# Patient Record
Sex: FEMALE | Race: WHITE | NOT HISPANIC OR LATINO
[De-identification: names, ages, dates, MRNs, and addresses within clinical notes are randomized per-mention and may not be internally consistent; named-entity substitution may affect disease eponyms.]

---

## 2022-08-18 ENCOUNTER — APPOINTMENT (OUTPATIENT)
Dept: GASTROENTEROLOGY | Facility: CLINIC | Age: 82
End: 2022-08-18

## 2022-08-18 VITALS
OXYGEN SATURATION: 98 % | WEIGHT: 131 LBS | BODY MASS INDEX: 26.41 KG/M2 | TEMPERATURE: 97.7 F | HEART RATE: 72 BPM | DIASTOLIC BLOOD PRESSURE: 60 MMHG | HEIGHT: 59 IN | SYSTOLIC BLOOD PRESSURE: 108 MMHG

## 2022-08-18 PROCEDURE — 99214 OFFICE O/P EST MOD 30 MIN: CPT

## 2022-08-18 NOTE — ASSESSMENT
[FreeTextEntry1] : \par 1.  Abnormal CBC, significant absolute lymphocytosis, rule out chronic lymphocytic leukemia, at least not previously diagnosed and communicated to the patient\par 2.  A variety of recent symptoms such as malaise loss of appetite, small weight loss, possibly related to this hematologic condition\par 3.  Patient needs full medical evaluation and I am referring her to Dr.Hajat Haider\par \par More than 50% of the face to face time was devoted to counseling and /or coordination of care.  THis coordination of care may have included reviewing other medical notes and reports, and communicating with other health professionals\par

## 2022-08-18 NOTE — HISTORY OF PRESENT ILLNESS
[FreeTextEntry1] : In office visit, 82-year-old patient last seen 5 years ago for a colonoscopy, now referred by a covering internist Dr. Lianne hinds with recent onset of malaise, chills, just not feeling right, some soft stools perhaps more frequent, but not david diarrhea.  Stools were done and the culture was negative.\par Labs were done and were sent over, and it turns out that she has a significant lymphocytosis to 25,000 white count 70% or more being lymphocytes.  No blast forms are reported, hemoglobin and platelets are normal.  Sed rate normal.\par \par Patient was advised to see GI

## 2022-08-18 NOTE — PHYSICAL EXAM
[Bowel Sounds] : normal bowel sounds [Abdomen Soft] : soft [Abdomen Tenderness] : non-tender [] : no hepato-splenomegaly [Abdomen Mass (___ Cm)] : no abdominal mass palpated [Normal Sphincter Tone] : normal sphincter tone [No Rectal Mass] : no rectal mass [Internal Hemorrhoid] : no internal hemorrhoids [External Hemorrhoid] : no external hemorrhoids [Occult Blood Positive] : stool was negative for occult blood [FreeTextEntry1] : Small amount of brown formed guaiac-negative stool

## 2022-08-23 RX ORDER — LACTULOSE 10 G/15ML
10 SOLUTION ORAL
Qty: 450 | Refills: 5 | Status: DISCONTINUED | COMMUNITY
Start: 2022-08-23 | End: 2022-08-23

## 2022-08-30 ENCOUNTER — APPOINTMENT (OUTPATIENT)
Dept: FAMILY MEDICINE | Facility: CLINIC | Age: 82
End: 2022-08-30

## 2022-08-30 ENCOUNTER — NON-APPOINTMENT (OUTPATIENT)
Age: 82
End: 2022-08-30

## 2022-08-30 ENCOUNTER — LABORATORY RESULT (OUTPATIENT)
Age: 82
End: 2022-08-30

## 2022-08-30 VITALS
TEMPERATURE: 97.3 F | HEART RATE: 59 BPM | HEIGHT: 59 IN | OXYGEN SATURATION: 97 % | RESPIRATION RATE: 16 BRPM | SYSTOLIC BLOOD PRESSURE: 122 MMHG | WEIGHT: 130 LBS | DIASTOLIC BLOOD PRESSURE: 66 MMHG | BODY MASS INDEX: 26.21 KG/M2

## 2022-08-30 DIAGNOSIS — R11.0 NAUSEA: ICD-10-CM

## 2022-08-30 DIAGNOSIS — R19.4 CHANGE IN BOWEL HABIT: ICD-10-CM

## 2022-08-30 PROCEDURE — G0439: CPT

## 2022-08-30 PROCEDURE — 93000 ELECTROCARDIOGRAM COMPLETE: CPT | Mod: 59

## 2022-08-30 PROCEDURE — 36415 COLL VENOUS BLD VENIPUNCTURE: CPT

## 2022-08-30 PROCEDURE — G0444 DEPRESSION SCREEN ANNUAL: CPT | Mod: 59

## 2022-08-30 PROCEDURE — 99213 OFFICE O/P EST LOW 20 MIN: CPT | Mod: 25

## 2022-08-30 RX ORDER — ASPIRIN 81 MG
81 TABLET, DELAYED RELEASE (ENTERIC COATED) ORAL DAILY
Refills: 0 | Status: ACTIVE | COMMUNITY
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
## 2022-08-30 NOTE — HEALTH RISK ASSESSMENT
[Good] : ~his/her~  mood as  good [Former] : Former [No] : In the past 12 months have you used drugs other than those required for medical reasons? No [No falls in past year] : Patient reported no falls in the past year [PHQ-9 Positive] : PHQ-9 Positive [I have developed a follow-up plan documented below in the note.] : I have developed a follow-up plan documented below in the note. [Patient declined PAP Smear] : Patient declined PAP Smear [3] : 1) Little interest or pleasure doing things for nearly every day (3) [de-identified] : walking/ physical therapy [de-identified] : varied [[id number]] : 6 [Patient reported mammogram was normal] : Patient reported mammogram was normal [Patient reported colonoscopy was normal] : Patient reported colonoscopy was normal [Change in mental status noted] : No change in mental status noted [None] : None [Alone] : lives alone [Retired] : retired [Feels Safe at Home] : Feels safe at home [Fully functional (bathing, dressing, toileting, transferring, walking, feeding)] : Fully functional (bathing, dressing, toileting, transferring, walking, feeding) [Fully functional (using the telephone, shopping, preparing meals, housekeeping, doing laundry, using] : Fully functional and needs no help or supervision to perform IADLs (using the telephone, shopping, preparing meals, housekeeping, doing laundry, using transportation, managing medications and managing finances) [Reports changes in hearing] : Reports no changes in hearing [Reports changes in vision] : Reports no changes in vision [Reports normal functional visual acuity (ie: able to read med bottle)] : Reports normal functional visual acuity [Reports changes in dental health] : Reports no changes in dental health [MammogramDate] : 06/2020 [ColonoscopyDate] : 01/2016

## 2022-08-30 NOTE — PHYSICAL EXAM
[No Acute Distress] : no acute distress [Well Nourished] : well nourished [Well Developed] : well developed [Well-Appearing] : well-appearing [Normal Sclera/Conjunctiva] : normal sclera/conjunctiva [PERRL] : pupils equal round and reactive to light [EOMI] : extraocular movements intact [Normal Outer Ear/Nose] : the outer ears and nose were normal in appearance [Normal Oropharynx] : the oropharynx was normal [No JVD] : no jugular venous distention [No Lymphadenopathy] : no lymphadenopathy [Supple] : supple [Thyroid Normal, No Nodules] : the thyroid was normal and there were no nodules present [No Respiratory Distress] : no respiratory distress  [No Accessory Muscle Use] : no accessory muscle use [Clear to Auscultation] : lungs were clear to auscultation bilaterally [Normal Rate] : normal rate  [Regular Rhythm] : with a regular rhythm [Normal S1, S2] : normal S1 and S2 [No Murmur] : no murmur heard [No Carotid Bruits] : no carotid bruits [No Abdominal Bruit] : a ~M bruit was not heard ~T in the abdomen [No Varicosities] : no varicosities [Pedal Pulses Present] : the pedal pulses are present [No Edema] : there was no peripheral edema [No Palpable Aorta] : no palpable aorta [No Extremity Clubbing/Cyanosis] : no extremity clubbing/cyanosis [Soft] : abdomen soft [Non Tender] : non-tender [Non-distended] : non-distended [No Masses] : no abdominal mass palpated [No HSM] : no HSM [Normal Bowel Sounds] : normal bowel sounds [Normal Posterior Cervical Nodes] : no posterior cervical lymphadenopathy [Normal Anterior Cervical Nodes] : no anterior cervical lymphadenopathy [No CVA Tenderness] : no CVA  tenderness [No Spinal Tenderness] : no spinal tenderness [No Joint Swelling] : no joint swelling [Grossly Normal Strength/Tone] : grossly normal strength/tone [No Rash] : no rash [Coordination Grossly Intact] : coordination grossly intact [No Focal Deficits] : no focal deficits [Normal Gait] : normal gait [Deep Tendon Reflexes (DTR)] : deep tendon reflexes were 2+ and symmetric [Normal Affect] : the affect was normal [Normal Insight/Judgement] : insight and judgment were intact [de-identified] : ambulates with walker [de-identified] : a

## 2022-08-30 NOTE — PLAN
[FreeTextEntry1] : 83 yo F, with CAD s/p stent, anxiety and depression, lumbar spinal stenosis, here for annual physical and eval of leukocytosis\par - labs drawn in office, will call with results\par - colonoscopy and mammo UTD\par - follow up labs, referral to heme if not down trending\par - plan as above\par - follow up 5 weeks for depression

## 2022-08-31 ENCOUNTER — APPOINTMENT (OUTPATIENT)
Dept: FAMILY MEDICINE | Facility: CLINIC | Age: 82
End: 2022-08-31

## 2022-09-01 LAB
25(OH)D3 SERPL-MCNC: 46.2 NG/ML
ALBUMIN SERPL ELPH-MCNC: 4.2 G/DL
ALP BLD-CCNC: 135 U/L
ALT SERPL-CCNC: 19 U/L
ANION GAP SERPL CALC-SCNC: 16 MMOL/L
ANISOCYTOSIS BLD QL: SLIGHT
APPEARANCE: CLEAR
AST SERPL-CCNC: 23 U/L
BACTERIA UR CULT: NORMAL
BACTERIA: NEGATIVE
BASOPHILS # BLD AUTO: 0 K/UL
BASOPHILS # BLD AUTO: 0 K/UL
BASOPHILS NFR BLD AUTO: 0 %
BASOPHILS NFR BLD AUTO: 0 %
BILIRUB SERPL-MCNC: 0.9 MG/DL
BILIRUBIN URINE: NEGATIVE
BLOOD URINE: NEGATIVE
BUN SERPL-MCNC: 11 MG/DL
CALCIUM SERPL-MCNC: 9.8 MG/DL
CHLORIDE SERPL-SCNC: 94 MMOL/L
CHOLEST SERPL-MCNC: 110 MG/DL
CO2 SERPL-SCNC: 18 MMOL/L
COLOR: NORMAL
CREAT SERPL-MCNC: 1.09 MG/DL
CRP SERPL-MCNC: <3 MG/L
EGFR: 51 ML/MIN/1.73M2
EOSINOPHIL # BLD AUTO: 0.31 K/UL
EOSINOPHIL # BLD AUTO: 0.31 K/UL
EOSINOPHIL NFR BLD AUTO: 0.9 %
EOSINOPHIL NFR BLD AUTO: 0.9 %
ERYTHROCYTE [SEDIMENTATION RATE] IN BLOOD BY WESTERGREN METHOD: 5 MM/HR
ESTIMATED AVERAGE GLUCOSE: 123 MG/DL
FERRITIN SERPL-MCNC: 233 NG/ML
FOLATE SERPL-MCNC: 16 NG/ML
GLUCOSE QUALITATIVE U: NEGATIVE
GLUCOSE SERPL-MCNC: 89 MG/DL
HAV IGM SER QL: NONREACTIVE
HBA1C MFR BLD HPLC: 5.9 %
HBV SURFACE AB SER QL: NONREACTIVE
HBV SURFACE AG SER QL: NONREACTIVE
HCT VFR BLD CALC: 39.1 %
HCV AB SER QL: NONREACTIVE
HCV S/CO RATIO: 0.2 S/CO
HDLC SERPL-MCNC: 47 MG/DL
HEPATITIS A IGG ANTIBODY: REACTIVE
HGB BLD-MCNC: 12.6 G/DL
HIV1+2 AB SPEC QL IA.RAPID: NONREACTIVE
HYALINE CASTS: 0 /LPF
IRON SATN MFR SERPL: 28 %
IRON SERPL-MCNC: 73 UG/DL
KETONES URINE: NEGATIVE
LDLC SERPL CALC-MCNC: 49 MG/DL
LEUKOCYTE ESTERASE URINE: ABNORMAL
LYMPHOCYTES # BLD AUTO: 28.91 K/UL
LYMPHOCYTES # BLD AUTO: 28.91 K/UL
LYMPHOCYTES NFR BLD AUTO: 82.8 %
LYMPHOCYTES NFR BLD AUTO: 82.8 %
MAN DIFF?: NORMAL
MCHC RBC-ENTMCNC: 28.3 PG
MCHC RBC-ENTMCNC: 32.2 GM/DL
MCV RBC AUTO: 87.7 FL
MICROSCOPIC-UA: NORMAL
MONOCYTES # BLD AUTO: 0.59 K/UL
MONOCYTES # BLD AUTO: 0.59 K/UL
MONOCYTES NFR BLD AUTO: 1.7 %
MONOCYTES NFR BLD AUTO: 1.7 %
MSMEAR: NORMAL
NEUTROPHILS # BLD AUTO: 3.6 K/UL
NEUTROPHILS # BLD AUTO: 3.6 K/UL
NEUTROPHILS NFR BLD AUTO: 10.3 %
NEUTROPHILS NFR BLD AUTO: 10.3 %
NITRITE URINE: NEGATIVE
NONHDLC SERPL-MCNC: 63 MG/DL
PH URINE: 5.5
PLAT MORPH BLD: NORMAL
PLATELET # BLD AUTO: 251 K/UL
POIKILOCYTOSIS BLD QL SMEAR: NORMAL
POLYCHROMASIA BLD QL SMEAR: SLIGHT
POTASSIUM SERPL-SCNC: 4.9 MMOL/L
PROT SERPL-MCNC: 6.3 G/DL
PROTEIN URINE: NEGATIVE
RBC # BLD: 4.46 M/UL
RBC # FLD: 13.3 %
RBC BLD AUTO: ABNORMAL
RED BLOOD CELLS URINE: 2 /HPF
SCHISTOCYTES BLD QL SMEAR: NORMAL
SODIUM SERPL-SCNC: 128 MMOL/L
SPECIFIC GRAVITY URINE: 1.01
SQUAMOUS EPITHELIAL CELLS: 3 /HPF
T4 FREE SERPL-MCNC: 1.7 NG/DL
TIBC SERPL-MCNC: 256 UG/DL
TRIGL SERPL-MCNC: 67 MG/DL
TSH SERPL-ACNC: 0.83 UIU/ML
UIBC SERPL-MCNC: 183 UG/DL
UROBILINOGEN URINE: NORMAL
VARIANT LYMPHS # BLD MANUAL: 4.3 %
VIT B12 SERPL-MCNC: 1244 PG/ML
WBC # FLD AUTO: 34.91 K/UL
WHITE BLOOD CELLS URINE: 10 /HPF

## 2022-09-06 LAB
ALBUMIN MFR SERPL ELPH: 63.9 %
ALBUMIN SERPL-MCNC: 4 G/DL
ALBUMIN/GLOB SERPL: 1.7 RATIO
ALPHA1 GLOB MFR SERPL ELPH: 4 %
ALPHA1 GLOB SERPL ELPH-MCNC: 0.3 G/DL
ALPHA2 GLOB MFR SERPL ELPH: 9.8 %
ALPHA2 GLOB SERPL ELPH-MCNC: 0.6 G/DL
B-GLOBULIN MFR SERPL ELPH: 9.1 %
B-GLOBULIN SERPL ELPH-MCNC: 0.6 G/DL
GAMMA GLOB FLD ELPH-MCNC: 0.8 G/DL
GAMMA GLOB MFR SERPL ELPH: 13.2 %
INTERPRETATION SERPL IEP-IMP: NORMAL
PROT SERPL-MCNC: 6.3 G/DL
PROT SERPL-MCNC: 6.3 G/DL

## 2022-09-08 ENCOUNTER — TRANSCRIPTION ENCOUNTER (OUTPATIENT)
Age: 82
End: 2022-09-08

## 2022-09-09 ENCOUNTER — NON-APPOINTMENT (OUTPATIENT)
Age: 82
End: 2022-09-09

## 2022-09-09 ENCOUNTER — APPOINTMENT (OUTPATIENT)
Dept: FAMILY MEDICINE | Facility: CLINIC | Age: 82
End: 2022-09-09

## 2022-09-12 ENCOUNTER — NON-APPOINTMENT (OUTPATIENT)
Age: 82
End: 2022-09-12

## 2022-09-12 ENCOUNTER — LABORATORY RESULT (OUTPATIENT)
Age: 82
End: 2022-09-12

## 2022-09-12 ENCOUNTER — APPOINTMENT (OUTPATIENT)
Dept: FAMILY MEDICINE | Facility: CLINIC | Age: 82
End: 2022-09-12

## 2022-09-12 VITALS
TEMPERATURE: 98.8 F | DIASTOLIC BLOOD PRESSURE: 60 MMHG | SYSTOLIC BLOOD PRESSURE: 130 MMHG | HEART RATE: 80 BPM | BODY MASS INDEX: 25.4 KG/M2 | HEIGHT: 59 IN | OXYGEN SATURATION: 99 % | WEIGHT: 126 LBS

## 2022-09-12 DIAGNOSIS — R63.4 ABNORMAL WEIGHT LOSS: ICD-10-CM

## 2022-09-12 DIAGNOSIS — Z09 ENCOUNTER FOR FOLLOW-UP EXAMINATION AFTER COMPLETED TREATMENT FOR CONDITIONS OTHER THAN MALIGNANT NEOPLASM: ICD-10-CM

## 2022-09-12 PROCEDURE — 36415 COLL VENOUS BLD VENIPUNCTURE: CPT

## 2022-09-12 PROCEDURE — 99495 TRANSJ CARE MGMT MOD F2F 14D: CPT | Mod: 25

## 2022-09-12 RX ORDER — ONDANSETRON 4 MG/1
4 TABLET ORAL DAILY
Qty: 5 | Refills: 0 | Status: DISCONTINUED | COMMUNITY
Start: 2022-08-30 | End: 2022-09-12

## 2022-09-13 LAB
ALBUMIN SERPL ELPH-MCNC: 4.2 G/DL
ALP BLD-CCNC: 123 U/L
ALT SERPL-CCNC: 27 U/L
ANION GAP SERPL CALC-SCNC: 12 MMOL/L
AST SERPL-CCNC: 25 U/L
BILIRUB SERPL-MCNC: 0.5 MG/DL
BUN SERPL-MCNC: 17 MG/DL
CALCIUM SERPL-MCNC: 9.4 MG/DL
CHLORIDE SERPL-SCNC: 99 MMOL/L
CO2 SERPL-SCNC: 20 MMOL/L
CREAT SERPL-MCNC: 1.57 MG/DL
EGFR: 33 ML/MIN/1.73M2
GLUCOSE SERPL-MCNC: 83 MG/DL
POTASSIUM SERPL-SCNC: 4.9 MMOL/L
PROT SERPL-MCNC: 6.3 G/DL
SODIUM SERPL-SCNC: 131 MMOL/L

## 2022-09-13 RX ORDER — OLMESARTAN MEDOXOMIL 5 MG/1
5 TABLET, FILM COATED ORAL DAILY
Refills: 0 | Status: DISCONTINUED | COMMUNITY
End: 2022-09-13

## 2022-09-15 ENCOUNTER — APPOINTMENT (OUTPATIENT)
Dept: NEPHROLOGY | Facility: CLINIC | Age: 82
End: 2022-09-15

## 2022-09-15 VITALS
SYSTOLIC BLOOD PRESSURE: 144 MMHG | TEMPERATURE: 97.8 F | WEIGHT: 126 LBS | HEART RATE: 84 BPM | DIASTOLIC BLOOD PRESSURE: 70 MMHG | HEIGHT: 59 IN | BODY MASS INDEX: 25.4 KG/M2 | OXYGEN SATURATION: 98 %

## 2022-09-15 PROCEDURE — 99204 OFFICE O/P NEW MOD 45 MIN: CPT

## 2022-09-18 NOTE — ASSESSMENT
[FreeTextEntry1] : 81 yo woman w/ PMHx of lymphoma (dx 20 yrs ago), left sided breast lumpectomy, CAD (with stent placement in 2021), hypertension, anxiety, lumbar spinal stenosis who presents with generalized fatigue, chills, nausea, poor oral intake with weight loss, 20 lbs weight loss for the past month recently admitted to [facility] for evaluation and management, found to have hyponatremia and NIKKI, presenting today for post-discharge evaluation.\par \par \par #NIKKI - unknown baseline creatinine, trends 1.4 -> 1.2 -> 1.8 ->1.5 (9/12/22); no proteinuria or hematuria, low urine sodium; CT scan: No calculi or hydronephrosis. Slightly prominent right extrarenal pelvis. Bilateral cortical parenchymal thinning. Left renal cyst \par - likely pre-renal NIKKI in setting of dehydration and following diuresis with possible perfusional iATN given blood pressure fluctuations on admission - improving \par - maintain adequate hydration up to 35 oz a day \par - avoid nephrotoxins/ACE/ARB/NSAIDs \par - renally adjusted meds/ ABx\par \par #Hyponatremia - likely medication-induced, patient is taking bupropion, tizanidine\par - sNa at 131 on 9/12/22\par - increase 1g sodium chloride to three times a day\par - strict fluid restriction to 35 oz a day\par - obtain blood work in 2 weeks \par \par #Thyroid nodule - euthyroid\par - pending thyroid biopsy \par - following by Endocrinology \par \par #Leucocytosis/ Lymphocystosis - stable\par - following by HemOnc \par \par #Hypertension - on carvedilol 3.125 mg po q12hr\par - home blood pressure monitoring \par \par #Anxiety/ depression - controlled with bupropion  mg qd, continue \par \par follow up in 2 weeks

## 2022-09-18 NOTE — HISTORY OF PRESENT ILLNESS
[FreeTextEntry1] : 83 yo woman w/ PMHx of lymphoma (dx 20 yrs ago), left sided breast lumpectomy, CAD (with stent placement in 2021), hypertension, anxiety, lumbar spinal stenosis who presents with generalized fatigue, chills, nausea, poor oral intake with weight loss, 20 lbs weight loss for the past month recently admitted to [facility] for evaluation and management, found to have hyponatremia and NIKKI, presenting today for post-discharge evaluation.\par Lives in assisted leaving facility. Accompanied by son.\par \par \par patient denies any active complaints at present\par hearing impairment, using hearing aid \par denies any changes in appetite\par denies dizziness, lightheadedness\par no chest pain, shortness of breath, or edema \par am nausea but no vomiting, ano abdominal or flank pain\par +intermittent constipation, no diarrhea\par no dysuria, changes in urination, or hematuria\par no muscle weakness, aches or cramps \par \par patient is off gabapentin, continue with bupropion, new tizanidine started \par recent labs reviewed, sNa 131, Cr 1.5 (9/12/22)\par currently on 1g salt tabs po bid, with no fluid restriction, patient "was told to drink enough fluids"

## 2022-09-18 NOTE — REASON FOR VISIT
[Consultation] : a consultation visit [Family Member] : family member [FreeTextEntry1] : jennifer and hyponatremia

## 2022-09-18 NOTE — PHYSICAL EXAM
[General Appearance - Alert] : alert [General Appearance - In No Acute Distress] : in no acute distress [General Appearance - Well Nourished] : well nourished [Extraocular Movements] : extraocular movements were intact [Jugular Venous Distention Increased] : there was no jugular-venous distention [] : no respiratory distress [Respiration, Rhythm And Depth] : normal respiratory rhythm and effort [Exaggerated Use Of Accessory Muscles For Inspiration] : no accessory muscle use [Auscultation Breath Sounds / Voice Sounds] : lungs were clear to auscultation bilaterally [Heart Rate And Rhythm] : heart rate was normal and rhythm regular [Heart Sounds] : normal S1 and S2 [Edema] : there was no peripheral edema [No CVA Tenderness] : no ~M costovertebral angle tenderness [Musculoskeletal - Swelling] : no joint swelling seen [Mood] : the mood was normal [FreeTextEntry1] : hearing impairment

## 2022-09-22 ENCOUNTER — RESULT REVIEW (OUTPATIENT)
Age: 82
End: 2022-09-22

## 2022-09-22 ENCOUNTER — APPOINTMENT (OUTPATIENT)
Dept: HEMATOLOGY ONCOLOGY | Facility: CLINIC | Age: 82
End: 2022-09-22

## 2022-09-22 ENCOUNTER — NON-APPOINTMENT (OUTPATIENT)
Age: 82
End: 2022-09-22

## 2022-09-22 VITALS
HEART RATE: 69 BPM | SYSTOLIC BLOOD PRESSURE: 143 MMHG | OXYGEN SATURATION: 95 % | WEIGHT: 123 LBS | DIASTOLIC BLOOD PRESSURE: 68 MMHG | RESPIRATION RATE: 69 BRPM | HEIGHT: 59 IN | TEMPERATURE: 97.1 F | BODY MASS INDEX: 24.8 KG/M2

## 2022-09-22 DIAGNOSIS — E04.2 NONTOXIC MULTINODULAR GOITER: ICD-10-CM

## 2022-09-22 DIAGNOSIS — Z78.9 OTHER SPECIFIED HEALTH STATUS: ICD-10-CM

## 2022-09-22 DIAGNOSIS — Z85.79 PERSONAL HISTORY OF OTHER MALIGNANT NEOPLASMS OF LYMPHOID, HEMATOPOIETIC AND RELATED TISSUES: ICD-10-CM

## 2022-09-22 PROCEDURE — 99215 OFFICE O/P EST HI 40 MIN: CPT | Mod: 25

## 2022-09-22 PROCEDURE — 36415 COLL VENOUS BLD VENIPUNCTURE: CPT

## 2022-09-22 RX ORDER — TIZANIDINE HYDROCHLORIDE 2 MG/1
2 CAPSULE ORAL
Refills: 0 | Status: DISCONTINUED | COMMUNITY
End: 2022-09-22

## 2022-09-23 PROBLEM — R63.4 UNINTENTIONAL WEIGHT LOSS: Status: ACTIVE | Noted: 2022-09-13

## 2022-09-23 LAB
BASOPHILS # BLD AUTO: 0.76 K/UL
BASOPHILS NFR BLD AUTO: 2 %
EOSINOPHIL # BLD AUTO: 0.38 K/UL
EOSINOPHIL NFR BLD AUTO: 1 %
HCT VFR BLD CALC: 37.7 %
HGB BLD-MCNC: 11.6 G/DL
LYMPHOCYTES # BLD AUTO: 27.69 K/UL
LYMPHOCYTES NFR BLD AUTO: 73 %
MAN DIFF?: NORMAL
MCHC RBC-ENTMCNC: 28.2 PG
MCHC RBC-ENTMCNC: 30.8 GM/DL
MCV RBC AUTO: 91.5 FL
MONOCYTES # BLD AUTO: 1.9 K/UL
MONOCYTES NFR BLD AUTO: 5 %
NEUTROPHILS # BLD AUTO: 4.93 K/UL
NEUTROPHILS NFR BLD AUTO: 13 %
PLATELET # BLD AUTO: 332 K/UL
RBC # BLD: 4.12 M/UL
RBC # FLD: 14 %
WBC # FLD AUTO: 37.93 K/UL

## 2022-09-23 NOTE — HISTORY OF PRESENT ILLNESS
[Discharge Summary] : discharge summary [Pertinent Labs] : pertinent labs [Discharge Med List] : discharge medication list [Med Reconciliation] : medication reconciliation has been completed [Patient Contacted By: ____] : and contacted by [unfilled] [FreeTextEntry2] : 81 yo F with lymphocytosis and hyponatremia, here for hospital discharge follow up for fatigue, low sodium.

## 2022-09-23 NOTE — PHYSICAL EXAM
[Normal] : normal rate, regular rhythm, normal S1 and S2 and no murmur heard [49474 - Moderate Complexity requires multiple possible diagnoses and/or the management options, moderate complexity of the medical data (tests, etc.) to be reviewed, and moderate risk of significant complications, morbidity, and/or mortality as well as co] : Moderate Complexity

## 2022-09-23 NOTE — PLAN
[FreeTextEntry1] : Labs drawn in office\par Follow up heme/onc\par Follow up nephro\par Follow up thyroid nodule biopsy

## 2022-09-27 ENCOUNTER — RESULT REVIEW (OUTPATIENT)
Age: 82
End: 2022-09-27

## 2022-09-28 PROBLEM — Z85.79 HISTORY OF MANTLE CELL LYMPHOMA: Status: RESOLVED | Noted: 2022-09-28 | Resolved: 2022-09-28

## 2022-09-28 PROBLEM — Z78.9 DENIES ALCOHOL CONSUMPTION: Status: ACTIVE | Noted: 2022-09-28

## 2022-09-28 PROBLEM — E04.2 MULTIPLE THYROID NODULES: Status: RESOLVED | Noted: 2022-09-28 | Resolved: 2022-09-28

## 2022-09-28 NOTE — ASSESSMENT
[FreeTextEntry1] : 83 yo woman w/ PMHx of lymphoma - supposedly mantle cell according to son (dx 20 yrs ago), left sided breast lumpectomy, CAD (with stent placement in 2021), hypertension, anxiety, lumbar spinal stenosis who presents with generalized fatigue, chills, nausea, poor oral intake with weight loss, 20 lbs weight loss for the past month recently admitted to [facility] for evaluation and management, found to have hyponatremia and NIKKI. 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  \par The differential diagnosis for further subclassification of this process includes marginal zone lymphoma, lymphoplasmacytic lymphoma, follicular lymphoma, and possibly chronic lymphocytic leukemia/small lymphocytic lymphoma or mantle cell lymphoma.  \par -checking FISH\par - will check IGHV\par -recommend PET CT\par -no B symptoms\par -If cw MCL, patients with low tumor burden, low-risk , disease may have an indolent course, managed by observation. If not consistent with these features may consider BR\par \par #thyroid nodule\par due for biopsy\par \par Follow up in 3 weeks to review blood work and PET CT

## 2022-09-28 NOTE — REVIEW OF SYSTEMS
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

## 2022-09-28 NOTE — HISTORY OF PRESENT ILLNESS
[de-identified] : Ms. Valladares is an 82 year old woman who presents as a hospital follow up.\par She presented to [place] ED 2022 with generalized fatigue, chills, nausea, poor PO intake, and 20lb weight loss over 1 month\par Blood work during hospitalization revealed WBC 25-32, hgb 10-1, lymphocytes up to 80%, NIKKI, , K:L 2.04, \par Flow: Peripheral blood, flow cytometric immunophenotyping: Involved by a B-cell lymphoproliferative disorder with partial CD5 expression, kappa light chain-restricted.  The\par differential diagnosis for further subclassification of this process includes marginal zone lymphoma, lymphoplasmacytic lymphoma, follicular lymphoma, and possibly chronic lymphocytic leukemia/small lymphocytic lymphoma or mantle cell lymphoma.  In addition, cyclin D1 immunohistochemistry on a bone marrow biopsy or FISH forCCND1/IGH might be considered.  If FISH testing is desired on this specimen, please call the signing pathologist.\par WBC:  21.9 x 10(9)/L\par %Lymphs (CBC/automated differential):  75%\par #Lymphs (CBC/automated differential):  16.5 x 10(9)/L\par Results:\par Blasts:  Not increased by CD45/side scatter and CD34.\par B-cells:  Monotypic kappa\par \par Age of Menarche: 13\par Age of Menopause: 50s\par OCP/HRT: OCP prior to menopause, no HRT.\par \par \par Health Maintenance:\par Mammo: 3/2022, q6months\par GYN:\par CNY: 2016\par DEXA: unknown when last done. \par She has a personal history of lymphoma dx 20 years ago and was managed by PCP.\par PMH: CAD w/ PCI, HTN, Breast Cancer  s/p lumpectomy/RT/Tamoxifen, ? Mantle Cell Lymphoma.  [de-identified] : Had been steadily improving since discharge until 1 week ago when she developed chills and productive cough with yellow sputum, seen at urgent care, negative for COVID/Flu. \par Seen by nephrology NaCl increased to 1g TID, advised to limit free water to 32oz daily. \par Scheduled for Thyroid bx next week.

## 2022-09-30 ENCOUNTER — NON-APPOINTMENT (OUTPATIENT)
Age: 82
End: 2022-09-30

## 2022-10-04 ENCOUNTER — APPOINTMENT (OUTPATIENT)
Dept: FAMILY MEDICINE | Facility: CLINIC | Age: 82
End: 2022-10-04

## 2022-10-04 VITALS
HEIGHT: 59 IN | OXYGEN SATURATION: 100 % | WEIGHT: 124 LBS | BODY MASS INDEX: 25 KG/M2 | HEART RATE: 99 BPM | SYSTOLIC BLOOD PRESSURE: 108 MMHG | TEMPERATURE: 98.2 F | DIASTOLIC BLOOD PRESSURE: 60 MMHG

## 2022-10-04 PROCEDURE — 36415 COLL VENOUS BLD VENIPUNCTURE: CPT

## 2022-10-04 PROCEDURE — 99214 OFFICE O/P EST MOD 30 MIN: CPT | Mod: 25

## 2022-10-04 RX ORDER — NORMAL SALT TABLETS 1 G/G
1 TABLET ORAL
Qty: 90 | Refills: 3 | Status: DISCONTINUED | COMMUNITY
Start: 2022-09-12 | End: 2022-10-04

## 2022-10-04 RX ORDER — FAMOTIDINE 20 MG/1
20 TABLET, FILM COATED ORAL AT BEDTIME
Refills: 0 | Status: DISCONTINUED | COMMUNITY
End: 2022-10-04

## 2022-10-04 RX ORDER — BUPROPION HYDROCHLORIDE 150 MG/1
150 TABLET, EXTENDED RELEASE ORAL DAILY
Qty: 30 | Refills: 0 | Status: DISCONTINUED | COMMUNITY
Start: 2022-08-30 | End: 2022-10-04

## 2022-10-04 RX ORDER — ONDANSETRON HYDROCHLORIDE 4 MG/1
4 TABLET, FILM COATED ORAL
Refills: 0 | Status: DISCONTINUED | COMMUNITY
End: 2022-10-04

## 2022-10-06 LAB
ALBUMIN SERPL ELPH-MCNC: 4.1 G/DL
ALP BLD-CCNC: 114 U/L
ALT SERPL-CCNC: 19 U/L
ANION GAP SERPL CALC-SCNC: 12 MMOL/L
AST SERPL-CCNC: 19 U/L
BILIRUB SERPL-MCNC: 0.8 MG/DL
BUN SERPL-MCNC: 27 MG/DL
CALCIUM SERPL-MCNC: 9.3 MG/DL
CHLORIDE SERPL-SCNC: 107 MMOL/L
CO2 SERPL-SCNC: 24 MMOL/L
CREAT SERPL-MCNC: 0.92 MG/DL
EGFR: 63 ML/MIN/1.73M2
GLUCOSE SERPL-MCNC: 98 MG/DL
POTASSIUM SERPL-SCNC: 4 MMOL/L
PROT SERPL-MCNC: 6.2 G/DL
SODIUM SERPL-SCNC: 143 MMOL/L

## 2022-10-10 ENCOUNTER — RESULT REVIEW (OUTPATIENT)
Age: 82
End: 2022-10-10

## 2022-10-10 NOTE — PLAN
[FreeTextEntry1] : Labs drawn in office\par All questions answered\par Reviewed specialist notes\par Will coordinate with nephrology once labs reviewed regarding salt tabs and water restriction

## 2022-10-21 ENCOUNTER — APPOINTMENT (OUTPATIENT)
Dept: HEMATOLOGY ONCOLOGY | Facility: CLINIC | Age: 82
End: 2022-10-21

## 2022-10-21 ENCOUNTER — RESULT REVIEW (OUTPATIENT)
Age: 82
End: 2022-10-21

## 2022-10-21 VITALS
TEMPERATURE: 97 F | DIASTOLIC BLOOD PRESSURE: 66 MMHG | BODY MASS INDEX: 25.2 KG/M2 | SYSTOLIC BLOOD PRESSURE: 143 MMHG | WEIGHT: 125 LBS | OXYGEN SATURATION: 96 % | HEART RATE: 76 BPM | RESPIRATION RATE: 16 BRPM | HEIGHT: 59 IN

## 2022-10-21 PROCEDURE — 99214 OFFICE O/P EST MOD 30 MIN: CPT | Mod: 25

## 2022-10-21 PROCEDURE — 36415 COLL VENOUS BLD VENIPUNCTURE: CPT

## 2022-10-21 NOTE — ASSESSMENT
[FreeTextEntry1] : 83 yo woman w/ PMHx of lymphoma - supposedly mantle cell according to son (dx 20 yrs ago), left sided breast lumpectomy, CAD (with stent placement in 2021), hypertension, anxiety, lumbar spinal stenosis who presents with generalized fatigue, chills, nausea, poor oral intake with weight loss, 20 lbs weight loss for the past month recently admitted to [facility] for evaluation and management, found to have hyponatremia and NIKKI. 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  \par The differential diagnosis for further subclassification of this process includes marginal zone lymphoma, lymphoplasmacytic lymphoma, follicular lymphoma, and possibly chronic lymphocytic leukemia/small lymphocytic lymphoma or mantle cell lymphoma.  \par -checking FISH\par - will check IGHV\par - PET CT - no evidence of bulky adenopathy or splenomegaly\par - no B symptoms\par -If cw MCL, patients with low tumor burden, low-risk , disease may have an indolent course, managed by observation. If not consistent with these features may consider BR\par \par #thyroid nodule\par Bx Benign\par \par Follow up in 3 month with cbc with diff, cmp, LDH, uric acid, ESR/CRP, mag, phos

## 2022-10-21 NOTE — REVIEW OF SYSTEMS
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

## 2022-10-21 NOTE — HISTORY OF PRESENT ILLNESS
[de-identified] : Ms. Valladares is an 82 year old woman who presents as a hospital follow up.\par She presented to [place] ED 2022 with generalized fatigue, chills, nausea, poor PO intake, and 20lb weight loss over 1 month\par Blood work during hospitalization revealed WBC 25-32, hgb 10-1, lymphocytes up to 80%, NIKKI, , K:L 2.04, \par Flow: Peripheral blood, flow cytometric immunophenotyping: Involved by a B-cell lymphoproliferative disorder with partial CD5 expression, kappa light chain-restricted.  The\par differential diagnosis for further subclassification of this process includes marginal zone lymphoma, lymphoplasmacytic lymphoma, follicular lymphoma, and possibly chronic lymphocytic leukemia/small lymphocytic lymphoma or mantle cell lymphoma.  In addition, cyclin D1 immunohistochemistry on a bone marrow biopsy or FISH forCCND1/IGH might be considered.  If FISH testing is desired on this specimen, please call the signing pathologist.\par WBC:  21.9 x 10(9)/L\par %Lymphs (CBC/automated differential):  75%\par #Lymphs (CBC/automated differential):  16.5 x 10(9)/L\par Results:\par Blasts:  Not increased by CD45/side scatter and CD34.\par B-cells:  Monotypic kappa\par \par Age of Menarche: 13\par Age of Menopause: 50s\par OCP/HRT: OCP prior to menopause, no HRT.\par \par \par Health Maintenance:\par Mammo: 3/2022, q6months\par GYN:\par CNY: 2016\par DEXA: unknown when last done. \par She has a personal history of lymphoma dx 20 years ago and was managed by PCP.\par PMH: CAD w/ PCI, HTN, Breast Cancer  s/p lumpectomy/RT/Tamoxifen, ? Mantle Cell Lymphoma.  [de-identified] : Feeling well overall.\par going to go back to living independently\par denies fevers, NS, weight loss

## 2022-10-28 ENCOUNTER — APPOINTMENT (OUTPATIENT)
Dept: NEPHROLOGY | Facility: CLINIC | Age: 82
End: 2022-10-28

## 2022-10-28 VITALS
BODY MASS INDEX: 24.6 KG/M2 | DIASTOLIC BLOOD PRESSURE: 70 MMHG | HEIGHT: 59 IN | HEART RATE: 77 BPM | WEIGHT: 122 LBS | SYSTOLIC BLOOD PRESSURE: 160 MMHG | OXYGEN SATURATION: 97 %

## 2022-10-28 PROCEDURE — 99214 OFFICE O/P EST MOD 30 MIN: CPT

## 2022-10-28 RX ORDER — DULOXETINE HYDROCHLORIDE 30 MG/1
30 CAPSULE, DELAYED RELEASE PELLETS ORAL
Qty: 30 | Refills: 0 | Status: DISCONTINUED | COMMUNITY
Start: 2022-10-18 | End: 2022-10-28

## 2022-11-17 ENCOUNTER — APPOINTMENT (OUTPATIENT)
Dept: ENDOCRINOLOGY | Facility: CLINIC | Age: 82
End: 2022-11-17

## 2022-11-17 ENCOUNTER — LABORATORY RESULT (OUTPATIENT)
Age: 82
End: 2022-11-17

## 2022-11-17 VITALS
WEIGHT: 122 LBS | HEART RATE: 62 BPM | HEIGHT: 59 IN | DIASTOLIC BLOOD PRESSURE: 70 MMHG | BODY MASS INDEX: 24.6 KG/M2 | OXYGEN SATURATION: 98 % | SYSTOLIC BLOOD PRESSURE: 132 MMHG

## 2022-11-17 PROCEDURE — 99204 OFFICE O/P NEW MOD 45 MIN: CPT

## 2022-11-17 NOTE — REVIEW OF SYSTEMS
[Hearing Loss] : hearing loss [Polyuria] : polyuria [Nocturia] : nocturia [Cold Intolerance] : cold intolerance [Negative] : Heme/Lymph [FreeTextEntry4] : Has a hearing aid [FreeTextEntry5] : Leg pain when walking had lumbar shot for leg and back pain arthritis

## 2022-11-17 NOTE — CONSULT LETTER
[Dear  ___] : Dear  [unfilled], [Consult Letter:] : I had the pleasure of evaluating your patient, [unfilled]. [Please see my note below.] : Please see my note below. [Sincerely,] : Sincerely, [FreeTextEntry3] : Sincerely,\par \par JAE BLAIR MD\par [facility]\par [facility]\par

## 2022-11-17 NOTE — HISTORY OF PRESENT ILLNESS
[FreeTextEntry1] : 83 yo woman w/ PMHx of lymphoma - supposedly mantle cell according to son (dx 20 yrs ago), left sided breast lumpectomy, CAD (with stent placement in 2021), hypertension, anxiety, lumbar spinal stenosis sent in a consult by her primary care physician for multinodular goiter status post biopsy September 2022 reviewed benign\par \par Per patient she takes 1 salt tablet and 46 ounces of water per day follows with nephrology\par \par Records reviewed\par \par Patient denies family history of thyroid cancer\par Denies history of neck irradiation\par Denies dysphagia\par Denies dysphonia\par Denies dyspnea

## 2022-11-27 NOTE — ASSESSMENT
[FreeTextEntry1] : 83 yo woman w/ PMHx of lymphoma (dx 20 yrs ago), left sided breast lumpectomy, CAD (with stent placement in 2021), hypertension, anxiety, lumbar spinal stenosis who is following with hyponatremia and NIKKI.\par \par recent labs and medication list reviewed in details\par \par #Resolving NIKKI - unknown baseline creatinine, Cr trends: 1.4 -> 1.2 -> 1.8 ->1.5 (9/12/22) ->1.0 (10/21/22); no proteinuria or hematuria, low urine sodium; CT scan: No calculi or hydronephrosis. Slightly prominent right extrarenal pelvis. Bilateral cortical parenchymal thinning. Left renal cyst \par - resolving pre-renal NIKKI in setting of dehydration and following diuresis with possible perfusional iATN given blood pressure fluctuations on admission   \par - avoid nephrotoxins/ACE/ARB/NSAIDs \par - renally adjusted meds/ ABx\par \par #Hyponatremia - likely medication-induced, patient was taking bupropion, tizanidine, gabapentin \par - sNa at 144 (10/21/22) on 1g NaCl po bid and ~40 oz a day of fluid intake \par - continue 1g sodium chloride bid \par - increase fluid intake up to 46 oz a day \par \par #Hypertension - acceptable blood pressure \par - continue carvedilol 3.125 mg po q12hr\par - continue home blood pressure monitoring \par \par #Anxiety/ depression - off bupropion, tizanidine, gabapentin \par - plan to resume medical therapy \par - repeat bmet 2-4 weeks after new medication started \par \par follow up in 2-4 weeks

## 2022-11-27 NOTE — HISTORY OF PRESENT ILLNESS
[FreeTextEntry1] : 81 yo woman w/ PMHx of lymphoma (dx 20 yrs ago), left sided breast lumpectomy, CAD (with stent placement in 2021), hypertension, anxiety, lumbar spinal stenosis who is following with hyponatremia and NIKKI.\par Lives in assisted leaving facility. \par \par \par denies any acute interim events\par denies any active complaints at present \par patient is off gabapentin, and bupropion\par \par currently on 1g NaCl po bid\par ~40 oz a day of fluid intake \par

## 2022-11-27 NOTE — PHYSICAL EXAM
[General Appearance - Alert] : alert [General Appearance - In No Acute Distress] : in no acute distress [Extraocular Movements] : extraocular movements were intact [Jugular Venous Distention Increased] : there was no jugular-venous distention [] : no respiratory distress [Respiration, Rhythm And Depth] : normal respiratory rhythm and effort [Exaggerated Use Of Accessory Muscles For Inspiration] : no accessory muscle use [Auscultation Breath Sounds / Voice Sounds] : lungs were clear to auscultation bilaterally [Heart Rate And Rhythm] : heart rate was normal and rhythm regular [Heart Sounds] : normal S1 and S2 [Edema] : there was no peripheral edema [No CVA Tenderness] : no ~M costovertebral angle tenderness [Musculoskeletal - Swelling] : no joint swelling seen [Mood] : the mood was normal [FreeTextEntry1] : hearing impairment

## 2022-11-27 NOTE — REASON FOR VISIT
[Family Member] : family member [Follow-Up] : a follow-up visit [FreeTextEntry1] : jennifer and hyponatremia

## 2022-12-01 ENCOUNTER — APPOINTMENT (OUTPATIENT)
Dept: NEPHROLOGY | Facility: CLINIC | Age: 82
End: 2022-12-01

## 2022-12-05 ENCOUNTER — RESULT REVIEW (OUTPATIENT)
Age: 82
End: 2022-12-05

## 2022-12-05 ENCOUNTER — APPOINTMENT (OUTPATIENT)
Dept: NEPHROLOGY | Facility: CLINIC | Age: 82
End: 2022-12-05

## 2022-12-05 VITALS
OXYGEN SATURATION: 99 % | SYSTOLIC BLOOD PRESSURE: 124 MMHG | BODY MASS INDEX: 24.64 KG/M2 | DIASTOLIC BLOOD PRESSURE: 76 MMHG | TEMPERATURE: 97.6 F | WEIGHT: 122 LBS | HEART RATE: 74 BPM

## 2022-12-05 DIAGNOSIS — R30.0 DYSURIA: ICD-10-CM

## 2022-12-05 PROCEDURE — 36415 COLL VENOUS BLD VENIPUNCTURE: CPT

## 2022-12-05 PROCEDURE — 99214 OFFICE O/P EST MOD 30 MIN: CPT | Mod: 25

## 2022-12-05 NOTE — ASSESSMENT
[FreeTextEntry1] : 81 yo woman w/ PMHx of lymphoma (dx 20 yrs ago), left sided breast lumpectomy, CAD (with stent placement in 2021), hypertension, anxiety, lumbar spinal stenosis presenting for follow up with hyponatremia.\par \par all available records and medication list reviewed in details\par \par #Hyponatremia - likely medication-induced, patient was taking bupropion, tizanidine, gabapentin - off \par - sNa at 144 (10/21/22) on 1g NaCl po daily and ~46 oz a day of fluid intake \par - obtain labs today and adjust regimen accordingly \par \par #Resolving NIKKI - unknown baseline creatinine, Cr trends: 1.4 -> 1.2 -> 1.8 ->1.5 (9/12/22) ->1.0 (10/21/22); no proteinuria or hematuria, low urine sodium; CT scan: No calculi or hydronephrosis. Slightly prominent right extrarenal pelvis. Bilateral cortical parenchymal thinning. Left renal cyst \par - NIKKI in setting of dehydration/ diuresis w/ possible component of iATN \par - obtain renal panel today \par - avoid nephrotoxins/ NSAIDs \par - renally adjusted meds/ ABx\par \par #Hypertension - acceptable blood pressure \par - continue carvedilol 3.125 mg po q12hr\par - continue home blood pressure monitoring \par \par #Dysuria - burning with urination\par - obtain ua w/ micro and urine cx \par - ABx vs antifungal vs lubrication \par - follow up with PMD, GYN \par \par follow up in 2 months

## 2022-12-05 NOTE — HISTORY OF PRESENT ILLNESS
[FreeTextEntry1] : 83 yo woman w/ PMHx of lymphoma (dx 20 yrs ago), left sided breast lumpectomy, CAD (with stent placement in 2021), hypertension, anxiety, lumbar spinal stenosis presenting for follow up with hyponatremia.\par Patient was staying at assisted living facility, now she is back home.\par Accompanied by brother today.\par \par she is currently on 1g NaCl po daily and ~46 oz a day of fluid intake for hyponatremia control\par denies any acute interim events, no recent acute illness or infection\par no fever or chills, no dizziness, weakness, unsteadiness\par no cp, sob, n/v/d or changes in urination or edema \par admits to burning sensation with urination but no hematuria \par \par patient is off gabapentin, and any mood medications \par  \par

## 2023-01-31 ENCOUNTER — RX RENEWAL (OUTPATIENT)
Age: 83
End: 2023-01-31

## 2023-02-03 ENCOUNTER — RESULT REVIEW (OUTPATIENT)
Age: 83
End: 2023-02-03

## 2023-02-03 ENCOUNTER — APPOINTMENT (OUTPATIENT)
Dept: HEMATOLOGY ONCOLOGY | Facility: CLINIC | Age: 83
End: 2023-02-03
Payer: MEDICARE

## 2023-02-03 VITALS
BODY MASS INDEX: 25.02 KG/M2 | HEIGHT: 59 IN | RESPIRATION RATE: 16 BRPM | HEART RATE: 61 BPM | OXYGEN SATURATION: 98 % | TEMPERATURE: 96.8 F | SYSTOLIC BLOOD PRESSURE: 125 MMHG | WEIGHT: 124.1 LBS | DIASTOLIC BLOOD PRESSURE: 65 MMHG

## 2023-02-03 VITALS
HEART RATE: 61 BPM | WEIGHT: 124.1 LBS | BODY MASS INDEX: 25.02 KG/M2 | RESPIRATION RATE: 16 BRPM | SYSTOLIC BLOOD PRESSURE: 125 MMHG | HEIGHT: 59 IN | TEMPERATURE: 96.8 F | OXYGEN SATURATION: 98 % | DIASTOLIC BLOOD PRESSURE: 65 MMHG

## 2023-02-03 PROCEDURE — 36415 COLL VENOUS BLD VENIPUNCTURE: CPT

## 2023-02-03 PROCEDURE — 99214 OFFICE O/P EST MOD 30 MIN: CPT | Mod: 25

## 2023-02-03 NOTE — ASSESSMENT
[FreeTextEntry1] : 81 yo woman w/ PMHx of lymphoma - supposedly mantle cell according to son (dx 20 yrs ago), left sided breast lumpectomy, CAD (with stent placement in 2021), hypertension, anxiety, lumbar spinal stenosis who presents with generalized fatigue, chills, nausea, poor oral intake with weight loss, 20 lbs weight loss for the past month recently admitted to [facility] for evaluation and management, found to have hyponatremia and NIKKI. 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  \par The differential diagnosis for further subclassification of this process includes marginal zone lymphoma, lymphoplasmacytic lymphoma, follicular lymphoma, and possibly chronic lymphocytic leukemia/small lymphocytic lymphoma or mantle cell lymphoma.  \par -checking FISH\par - will check IGHV\par - PET CT - no evidence of bulky adenopathy or splenomegaly\par - no B symptoms\par -If cw MCL, patients with low tumor burden, low-risk , disease may have an indolent course, managed by observation.\par May consider BR in future if needed. \par Currently does not have indication for treatment - no anemia, thrombocytopenia, B symptoms, bulky adenopathy. Continue to monitor\par \par #thyroid nodule\par Bx Benign\par \par Follow up in 3 month with cbc with diff, cmp, LDH, uric acid, ESR/CRP, mag, phos,  FISH,  IGHV

## 2023-02-03 NOTE — HISTORY OF PRESENT ILLNESS
[de-identified] : Ms. Valladares is an 82 year old woman who presents as a hospital follow up.\par She presented to [place] ED 2022 with generalized fatigue, chills, nausea, poor PO intake, and 20lb weight loss over 1 month\par Blood work during hospitalization revealed WBC 25-32, hgb 10-1, lymphocytes up to 80%, NIKKI, , K:L 2.04, \par Flow: Peripheral blood, flow cytometric immunophenotyping: Involved by a B-cell lymphoproliferative disorder with partial CD5 expression, kappa light chain-restricted.  The\par differential diagnosis for further subclassification of this process includes marginal zone lymphoma, lymphoplasmacytic lymphoma, follicular lymphoma, and possibly chronic lymphocytic leukemia/small lymphocytic lymphoma or mantle cell lymphoma.  In addition, cyclin D1 immunohistochemistry on a bone marrow biopsy or FISH forCCND1/IGH might be considered.  If FISH testing is desired on this specimen, please call the signing pathologist.\par WBC:  21.9 x 10(9)/L\par %Lymphs (CBC/automated differential):  75%\par #Lymphs (CBC/automated differential):  16.5 x 10(9)/L\par Results:\par Blasts:  Not increased by CD45/side scatter and CD34.\par B-cells:  Monotypic kappa\par \par Age of Menarche: 13\par Age of Menopause: 50s\par OCP/HRT: OCP prior to menopause, no HRT.\par \par \par Health Maintenance:\par Mammo: 3/2022, q6months\par GYN:\par CNY: 2016\par DEXA: unknown when last done. \par She has a personal history of lymphoma dx 20 years ago and was managed by PCP.\par PMH: CAD w/ PCI, HTN, Breast Cancer  s/p lumpectomy/RT/Tamoxifen, ? Mantle Cell Lymphoma.  [de-identified] : Feeling well overall.\par back to living independently\par denies fevers, NS, weight loss. Denies LAD\par complains of pain

## 2023-02-03 NOTE — REVIEW OF SYSTEMS
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

## 2023-02-14 ENCOUNTER — APPOINTMENT (OUTPATIENT)
Dept: NEPHROLOGY | Facility: CLINIC | Age: 83
End: 2023-02-14
Payer: MEDICARE

## 2023-02-14 VITALS
SYSTOLIC BLOOD PRESSURE: 140 MMHG | BODY MASS INDEX: 24.19 KG/M2 | HEIGHT: 59 IN | WEIGHT: 120 LBS | OXYGEN SATURATION: 97 % | DIASTOLIC BLOOD PRESSURE: 60 MMHG | HEART RATE: 60 BPM

## 2023-02-14 DIAGNOSIS — N17.9 ACUTE KIDNEY FAILURE, UNSPECIFIED: ICD-10-CM

## 2023-02-14 PROCEDURE — 99213 OFFICE O/P EST LOW 20 MIN: CPT

## 2023-02-14 RX ORDER — CIPROFLOXACIN HYDROCHLORIDE 250 MG/1
250 TABLET, FILM COATED ORAL
Qty: 20 | Refills: 0 | Status: DISCONTINUED | COMMUNITY
Start: 2022-12-08 | End: 2023-02-14

## 2023-02-14 NOTE — ASSESSMENT
[FreeTextEntry1] : 83 yo woman w/ PMHx of lymphoma (dx 20 yrs ago), left sided breast lumpectomy, CAD (s/p stent placement in 2021), hypertension, anxiety, lumbar spinal stenosis is following with hyponatremia.\par \par all available records and medication list reviewed in details\par \par #Hyponatremia - likely medication-induced, patient was taking bupropion, tizanidine, gabapentin - remains off \par - sNa at 142 (2/3/23) on 1g NaCl po daily and ~46 oz a day of fluid intake \par - stop daily 1g NaCl, continue with regular alimentary salt intake \par - continue with current fluid restriction at 46 oz a day \par  \par #NIKKI - resolved, unknown baseline creatinine, Cr trends: 1.4 > 1.2 > 1.8 >1.5 (9/2022) >1.0 (10/20/22) >1.0 (2/3/23); no proteinuria or hematuria, low urine sodium; CT scan: No calculi or hydronephrosis. Slightly prominent right extrarenal pelvis. Bilateral cortical parenchymal thinning. Left renal cyst  \par - adequate blood pressure control \par - avoid nephrotoxins/ NSAIDs \par \par #Hypertension - acceptable blood pressure \par - continue carvedilol 3.125 mg po q12hr\par - continue home blood pressure monitoring \par \par follow up in 2 months

## 2023-02-14 NOTE — HISTORY OF PRESENT ILLNESS
[FreeTextEntry1] : 83 yo woman w/ PMHx of lymphoma (dx 20 yrs ago), left sided breast lumpectomy, CAD (s/p stent placement in 2021), hypertension, anxiety, lumbar spinal stenosis is following with hyponatremia.\par Accompanied by brother.\par \par no acute interim events reported\par off gabapentin and Wellbutrin for long-time now \par denies any NSAIDs use \par currently on 1g NaCl po daily and ~46 oz a day of fluid intake for hyponatremia \par last sNa at 142 (2/3/23)\par \par patient admits decreasing appetite\par denies any other active complaints at present \par no dizziness, lightheadedness, or unsteadiness\par no cp, sob, n/v/d, no edema, no changes in urination \par  \par

## 2023-03-02 ENCOUNTER — LABORATORY RESULT (OUTPATIENT)
Age: 83
End: 2023-03-02

## 2023-03-02 ENCOUNTER — APPOINTMENT (OUTPATIENT)
Dept: FAMILY MEDICINE | Facility: CLINIC | Age: 83
End: 2023-03-02
Payer: MEDICARE

## 2023-03-02 VITALS
BODY MASS INDEX: 24.8 KG/M2 | SYSTOLIC BLOOD PRESSURE: 126 MMHG | TEMPERATURE: 98 F | OXYGEN SATURATION: 98 % | HEIGHT: 59 IN | WEIGHT: 123 LBS | DIASTOLIC BLOOD PRESSURE: 60 MMHG | HEART RATE: 64 BPM

## 2023-03-02 DIAGNOSIS — M48.062 SPINAL STENOSIS, LUMBAR REGION WITH NEUROGENIC CLAUDICATION: ICD-10-CM

## 2023-03-02 DIAGNOSIS — M47.816 SPONDYLOSIS W/OUT MYELOPATHY OR RADICULOPATHY, LUMBAR REGION: ICD-10-CM

## 2023-03-02 DIAGNOSIS — M54.16 RADICULOPATHY, LUMBAR REGION: ICD-10-CM

## 2023-03-02 PROCEDURE — 99397 PER PM REEVAL EST PAT 65+ YR: CPT | Mod: 25

## 2023-03-02 PROCEDURE — 36415 COLL VENOUS BLD VENIPUNCTURE: CPT

## 2023-03-02 PROCEDURE — 99213 OFFICE O/P EST LOW 20 MIN: CPT | Mod: 25

## 2023-03-02 PROCEDURE — G0444 DEPRESSION SCREEN ANNUAL: CPT | Mod: 59

## 2023-03-03 LAB
BASOPHILS # BLD AUTO: 0 K/UL
BASOPHILS NFR BLD AUTO: 0 %
EOSINOPHIL # BLD AUTO: 0.62 K/UL
EOSINOPHIL NFR BLD AUTO: 1 %
HCT VFR BLD CALC: 44.3 %
HGB BLD-MCNC: 13.3 G/DL
LYMPHOCYTES # BLD AUTO: 50.4 K/UL
LYMPHOCYTES NFR BLD AUTO: 81 %
MAN DIFF?: NORMAL
MCHC RBC-ENTMCNC: 27.2 PG
MCHC RBC-ENTMCNC: 30 GM/DL
MCV RBC AUTO: 90.6 FL
MONOCYTES # BLD AUTO: 1.87 K/UL
MONOCYTES NFR BLD AUTO: 3 %
NEUTROPHILS # BLD AUTO: 5.6 K/UL
NEUTROPHILS NFR BLD AUTO: 9 %
PLATELET # BLD AUTO: 255 K/UL
RBC # BLD: 4.89 M/UL
RBC # FLD: 16.4 %
WBC # FLD AUTO: 62.22 K/UL

## 2023-03-04 LAB
ALBUMIN SERPL ELPH-MCNC: 4.2 G/DL
ALP BLD-CCNC: 116 U/L
ALT SERPL-CCNC: 24 U/L
ANION GAP SERPL CALC-SCNC: 14 MMOL/L
APPEARANCE: CLEAR
AST SERPL-CCNC: 23 U/L
BACTERIA: NEGATIVE
BILIRUB SERPL-MCNC: 0.6 MG/DL
BILIRUBIN URINE: NEGATIVE
BLOOD URINE: NEGATIVE
BUN SERPL-MCNC: 29 MG/DL
CALCIUM SERPL-MCNC: 9.5 MG/DL
CHLORIDE SERPL-SCNC: 104 MMOL/L
CHOLEST SERPL-MCNC: 144 MG/DL
CO2 SERPL-SCNC: 24 MMOL/L
COLOR: NORMAL
CREAT SERPL-MCNC: 1.03 MG/DL
EGFR: 55 ML/MIN/1.73M2
GLUCOSE QUALITATIVE U: NEGATIVE
GLUCOSE SERPL-MCNC: 81 MG/DL
HDLC SERPL-MCNC: 58 MG/DL
HYALINE CASTS: 0 /LPF
KETONES URINE: NEGATIVE
LDLC SERPL CALC-MCNC: 72 MG/DL
LEUKOCYTE ESTERASE URINE: ABNORMAL
MICROSCOPIC-UA: NORMAL
NITRITE URINE: NEGATIVE
NONHDLC SERPL-MCNC: 85 MG/DL
PH URINE: 6
POTASSIUM SERPL-SCNC: 4.7 MMOL/L
PROT SERPL-MCNC: 6.3 G/DL
PROTEIN URINE: NORMAL
RED BLOOD CELLS URINE: 3 /HPF
SODIUM SERPL-SCNC: 142 MMOL/L
SPECIFIC GRAVITY URINE: 1.02
SQUAMOUS EPITHELIAL CELLS: 3 /HPF
TRIGL SERPL-MCNC: 68 MG/DL
UROBILINOGEN URINE: NORMAL
WHITE BLOOD CELLS URINE: 3 /HPF

## 2023-03-06 LAB — BACTERIA UR CULT: NORMAL

## 2023-03-10 PROBLEM — M47.816 LUMBAR FACET ARTHROPATHY: Status: ACTIVE | Noted: 2023-03-02

## 2023-03-10 NOTE — PLAN
[FreeTextEntry1] : Labs drawn in office\par Urine sample obtained\par Referral to urology for overactive bladder

## 2023-03-10 NOTE — HEALTH RISK ASSESSMENT
[No] : No [No falls in past year] : Patient reported no falls in the past year [0] : 2) Feeling down, depressed, or hopeless: Not at all (0) [Never] : Never [PHQ-2 Negative - No further assessment needed] : PHQ-2 Negative - No further assessment needed [[id number]] : 0

## 2023-03-10 NOTE — HISTORY OF PRESENT ILLNESS
[de-identified] : Annual physical, complaints of overactive bladder nightly, will check urine for infection, will need referral to urology.

## 2023-04-07 ENCOUNTER — APPOINTMENT (OUTPATIENT)
Dept: UROLOGY | Facility: HOSPITAL | Age: 83
End: 2023-04-07

## 2023-04-13 ENCOUNTER — APPOINTMENT (OUTPATIENT)
Dept: INTERNAL MEDICINE | Facility: CLINIC | Age: 83
End: 2023-04-13
Payer: MEDICARE

## 2023-04-13 ENCOUNTER — LABORATORY RESULT (OUTPATIENT)
Age: 83
End: 2023-04-13

## 2023-04-13 PROCEDURE — 36415 COLL VENOUS BLD VENIPUNCTURE: CPT

## 2023-04-14 LAB
ALBUMIN SERPL ELPH-MCNC: 4 G/DL
ALP BLD-CCNC: 101 U/L
ALT SERPL-CCNC: 16 U/L
ANION GAP SERPL CALC-SCNC: 12 MMOL/L
AST SERPL-CCNC: 20 U/L
BILIRUB SERPL-MCNC: 0.7 MG/DL
BUN SERPL-MCNC: 24 MG/DL
CALCIUM SERPL-MCNC: 9.1 MG/DL
CHLORIDE SERPL-SCNC: 106 MMOL/L
CO2 SERPL-SCNC: 23 MMOL/L
CREAT SERPL-MCNC: 1.09 MG/DL
EGFR: 51 ML/MIN/1.73M2
GLUCOSE SERPL-MCNC: 92 MG/DL
POTASSIUM SERPL-SCNC: 4.5 MMOL/L
PROT SERPL-MCNC: 5.9 G/DL
SODIUM SERPL-SCNC: 141 MMOL/L

## 2023-04-17 LAB
BASOPHILS # BLD AUTO: 0.49 K/UL
BASOPHILS NFR BLD AUTO: 0.8 %
EOSINOPHIL # BLD AUTO: 0.5 K/UL
EOSINOPHIL NFR BLD AUTO: 0.9 %
HCT VFR BLD CALC: 42.3 %
HGB BLD-MCNC: 12.6 G/DL
IMM GRANULOCYTES NFR BLD AUTO: 0.2 %
LYMPHOCYTES # BLD AUTO: 50.36 K/UL
LYMPHOCYTES NFR BLD AUTO: 86 %
MAN DIFF?: NORMAL
MCHC RBC-ENTMCNC: 27.4 PG
MCHC RBC-ENTMCNC: 29.8 GM/DL
MCV RBC AUTO: 92 FL
MONOCYTES # BLD AUTO: 3.22 K/UL
MONOCYTES NFR BLD AUTO: 5.5 %
NEUTROPHILS # BLD AUTO: 3.91 K/UL
NEUTROPHILS NFR BLD AUTO: 6.6 %
PLATELET # BLD AUTO: 238 K/UL
RBC # BLD: 4.6 M/UL
RBC # FLD: 16.4 %
WBC # FLD AUTO: 58.57 K/UL

## 2023-04-24 ENCOUNTER — APPOINTMENT (OUTPATIENT)
Dept: UROLOGY | Facility: CLINIC | Age: 83
End: 2023-04-24
Payer: MEDICARE

## 2023-04-24 VITALS
SYSTOLIC BLOOD PRESSURE: 132 MMHG | HEIGHT: 59 IN | DIASTOLIC BLOOD PRESSURE: 80 MMHG | WEIGHT: 125 LBS | HEART RATE: 64 BPM | BODY MASS INDEX: 25.2 KG/M2

## 2023-04-24 DIAGNOSIS — N28.1 CYST OF KIDNEY, ACQUIRED: ICD-10-CM

## 2023-04-24 PROCEDURE — 99203 OFFICE O/P NEW LOW 30 MIN: CPT

## 2023-04-24 NOTE — PHYSICAL EXAM
[General Appearance - Well Developed] : well developed [Normal Appearance] : normal appearance [General Appearance - In No Acute Distress] : no acute distress [Respiration, Rhythm And Depth] : normal respiratory rhythm and effort [Abdomen Soft] : soft [Costovertebral Angle Tenderness] : no ~M costovertebral angle tenderness [] : no rash [Oriented To Time, Place, And Person] : oriented to person, place, and time

## 2023-04-24 NOTE — ASSESSMENT
[FreeTextEntry1] : Patient is a 82 year female who presents with worsening urgency, frequency and urge incontinence over the last 6 months.  \par no associated pain.  no modifying factors.\par minimal bladder irritants; never a smoker\par no gross hematuria or dysuria\par UA micro at PCP 3/23 was normal \par \par A/P\par 1. overactive bladder\par 2 urge incontinence\par \par discussed minimizing bladder irritants \par Myrbetriq 50 mg QD discussed side effects with patient and brother especially HTN\par and understood, check BP 1-2x/week\par office 1 month \par \par

## 2023-05-08 RX ORDER — MIRABEGRON 50 MG/1
50 TABLET, FILM COATED, EXTENDED RELEASE ORAL
Qty: 30 | Refills: 1 | Status: COMPLETED | COMMUNITY
Start: 2023-04-24 | End: 2023-05-08

## 2023-05-26 ENCOUNTER — APPOINTMENT (OUTPATIENT)
Dept: HEMATOLOGY ONCOLOGY | Facility: CLINIC | Age: 83
End: 2023-05-26
Payer: MEDICARE

## 2023-05-26 ENCOUNTER — RESULT REVIEW (OUTPATIENT)
Age: 83
End: 2023-05-26

## 2023-05-26 ENCOUNTER — APPOINTMENT (OUTPATIENT)
Dept: UROLOGY | Facility: CLINIC | Age: 83
End: 2023-05-26
Payer: MEDICARE

## 2023-05-26 VITALS
HEART RATE: 64 BPM | DIASTOLIC BLOOD PRESSURE: 69 MMHG | SYSTOLIC BLOOD PRESSURE: 137 MMHG | OXYGEN SATURATION: 93 % | BODY MASS INDEX: 25.78 KG/M2 | HEIGHT: 59 IN | RESPIRATION RATE: 16 BRPM | TEMPERATURE: 97 F | WEIGHT: 127.9 LBS

## 2023-05-26 VITALS
WEIGHT: 127 LBS | HEART RATE: 62 BPM | DIASTOLIC BLOOD PRESSURE: 82 MMHG | HEIGHT: 59 IN | BODY MASS INDEX: 25.6 KG/M2 | SYSTOLIC BLOOD PRESSURE: 154 MMHG

## 2023-05-26 DIAGNOSIS — R74.02 ELEVATION OF LEVELS OF LACTIC ACID DEHYDROGENASE [LDH]: ICD-10-CM

## 2023-05-26 DIAGNOSIS — R91.8 OTHER NONSPECIFIC ABNORMAL FINDING OF LUNG FIELD: ICD-10-CM

## 2023-05-26 PROCEDURE — 99213 OFFICE O/P EST LOW 20 MIN: CPT

## 2023-06-04 PROBLEM — R91.8 GROUND GLASS OPACITY PRESENT ON IMAGING OF LUNG: Status: ACTIVE | Noted: 2023-06-04

## 2023-06-04 PROBLEM — R74.02 ELEVATED LDH: Status: ACTIVE | Noted: 2023-06-04

## 2023-06-04 NOTE — HISTORY OF PRESENT ILLNESS
[de-identified] : Ms. Valladares is an 82 year old woman who presents as a hospital follow up.\par \par She presented to [place] ED 2022 with generalized fatigue, chills, nausea, poor PO intake, and 20lb weight loss over 1 month\par \par Blood work during hospitalization revealed WBC 25-32, hgb 10-1, lymphocytes up to 80%, NIKKI, , K:L 2.04, \par \par Flow: Peripheral blood, flow cytometric immunophenotyping: Involved by a B-cell lymphoproliferative disorder with partial CD5 expression, kappa light chain-restricted.  The\par differential diagnosis for further subclassification of this process includes marginal zone lymphoma, lymphoplasmacytic lymphoma, follicular lymphoma, and possibly chronic lymphocytic leukemia/small lymphocytic lymphoma or mantle cell lymphoma.  In addition, cyclin D1 immunohistochemistry on a bone marrow biopsy or FISH forCCND1/IGH might be considered.  If FISH testing is desired on this specimen, please call the signing pathologist.\par \par WBC:  21.9 x 10(9)/L\par %Lymphs (CBC/automated differential):  75%\par #Lymphs (CBC/automated differential):  16.5 x 10(9)/L\par Results:\par Blasts:  Not increased by CD45/side scatter and CD34.\par B-cells:  Monotypic kappa\par \par Age of Menarche: 13\par Age of Menopause: 50s\par OCP/HRT: OCP prior to menopause, no HRT.\par \par \par Health Maintenance:\par Mammo: 3/2022, q6months\par GYN:\par CNY: 2016\par DEXA: unknown when last done. \par She has a personal history of lymphoma dx 20 years ago and was managed by PCP.\par PMH: CAD w/ PCI, HTN, Breast Cancer  s/p lumpectomy/RT/Tamoxifen, ? Mantle Cell Lymphoma.  [de-identified] : Patient presents today for routine follow up. She reports feeling overall well. She presents with her brother Aaron. She has been living independently. She notes feeling overall well. She has chronic back pain for which she follows with pain management and is scheduled for cortisone injection with Dr. Graf. She also participates in acupuncture. She has continued to follow with PCP Dr. Haider, nephrology Dr. Fenton and Urology Dr. Albrecht. pending appointment 5/26/23. Denies fevers/chills, night sweats, adenopathy, weight loss, HA, dizziness, SOB, cough, CP, palpitations, abd pain, n/v/d, swelling to extremities, back pain, hematuria, BRBPR, abnormal vaginal bleeding. \par \par

## 2023-06-04 NOTE — ASSESSMENT
[FreeTextEntry1] : Ms. Valladares is an 82 year old female with a PMH of lymphoma supposedly mantle cell according to son (dx 20 yrs ago), left sided breast lumpectomy, CAD (with stent placement in 2021), hypertension, anxiety, lumbar spinal stenosis who presents with generalized fatigue, chills, nausea, poor oral intake with weight loss, 20 lbs weight loss for the past month recently admitted to [facility] for evaluation and management, found to have hyponatremia and NIKKI. Was seen in hospital for leukocytosis\par \par #Leukocytosis 2/2 mantle lymphoma (supposedly)\par - Peripheral blood, flow cytometric immunophenotyping: Involved by a B-cell lymphoproliferative disorder with partial CD5 expression, kappa light chain-restricted.  \par - The differential diagnosis for further subclassification of this process includes marginal zone lymphoma, lymphoplasmacytic lymphoma, follicular lymphoma, and possibly chronic lymphocytic leukemia/small lymphocytic lymphoma or mantle cell lymphoma.  \par - s/p PET/CT 10/2022 revealing left upper lobe patchy groundglass opacities with mild FDG uptake to SUV 3.0, likely of nonspecific infectious/inflammatory etiology. No additional abnormal foci of FDG uptake to suggest biologic tumor activity.\par - No B symptoms \par - If consistent with MCL, patients with low tumor burden, low-risk , disease may have an indolent course, managed by observation.\par - May consider BR in future if needed. \par - Currently does not have indication for treatment as there is no anemia, thrombocytopenia, B symptoms, bulky adenopathy.\par - 5/26/23 vs and CBC reviewed, WBC 66.95, hgb 12.5, plt 236. Reviewed case with Dr. Tran. Reviewed trend of WBC with WBC doubling time of 5 mos. IGVH mutational analysis and FISH CLL panel to be completed today. Reviewed PET/CT. Repeat CT Chest to evaluate ground class opacities. Continues to deny B symptoms. No adenopathy on exam. Weight stable. LDH now slightly elevated at 237. RTC in 6 weeks to monitor blood counts more closely. Repeat flow at next visit. Plan of care discussed with patient and brother Aaron. \par \par #Thyroid nodule\par - s/p US 9/2022 revealing heterogenous thyroid gland. Recommend US guided FNA L sided nodule \par - s/p bx pathology benign Consistent with a follicular/colloid nodule with cystic degeneration\par \par #Health  Maintenance \par - PCP Dr. Charlton last seen 3/2/23 note reviewed \par - Pain Management Dr. Graf pending cortisone injections \par - Urology Dr. Albrecht appt 5/26/23 \par - Nephrology Dr. Vivar last seen 2/2023 not reviewed \par - CNY 2016, given family hx recommend follow up with GI to assess need for another CNY \par - Mammo/Sono 6/2022 BIRADS 2 \par \par LAB only in 6 weeks with CBC with diff, CMP, LDH, uric acid, ESR/CRP, mag, phos, flow cytometry \par RTC in 3 mos with CBC with diff, CMP, LDH, uric acid, Mg, Phos, ESR/CRP\par \par Case and management discussed and reviewed with Dr. Tran covering for Dr. Sears

## 2023-06-04 NOTE — REVIEW OF SYSTEMS
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

## 2023-06-09 ENCOUNTER — NON-APPOINTMENT (OUTPATIENT)
Age: 83
End: 2023-06-09

## 2023-06-09 NOTE — ASSESSMENT
[FreeTextEntry1] : Patient is a 82 year female with overactive bladder.  She was started on Myrbetriq 50 mg but developed HTN and had to stop.  She has had no interval hematuria or dysuria.\par \par A/P\par 1. OAB \par stop Myrbetriq due to HTN\par patient is not a candidate for anticholinergics due to age \par will start Gemtesa 75 mg PO QD\par office in 1 month  \par

## 2023-07-05 ENCOUNTER — RX RENEWAL (OUTPATIENT)
Age: 83
End: 2023-07-05

## 2023-07-05 ENCOUNTER — NON-APPOINTMENT (OUTPATIENT)
Age: 83
End: 2023-07-05

## 2023-07-07 ENCOUNTER — RESULT REVIEW (OUTPATIENT)
Age: 83
End: 2023-07-07

## 2023-07-07 ENCOUNTER — APPOINTMENT (OUTPATIENT)
Dept: HEMATOLOGY ONCOLOGY | Facility: CLINIC | Age: 83
End: 2023-07-07

## 2023-07-07 VITALS
TEMPERATURE: 97.6 F | DIASTOLIC BLOOD PRESSURE: 60 MMHG | BODY MASS INDEX: 26.25 KG/M2 | RESPIRATION RATE: 16 BRPM | OXYGEN SATURATION: 97 % | HEIGHT: 59 IN | HEART RATE: 61 BPM | WEIGHT: 130.2 LBS | SYSTOLIC BLOOD PRESSURE: 140 MMHG

## 2023-07-17 ENCOUNTER — NON-APPOINTMENT (OUTPATIENT)
Age: 83
End: 2023-07-17

## 2023-07-18 ENCOUNTER — APPOINTMENT (OUTPATIENT)
Dept: UROLOGY | Facility: CLINIC | Age: 83
End: 2023-07-18
Payer: MEDICARE

## 2023-07-18 PROCEDURE — 99442: CPT

## 2023-08-09 ENCOUNTER — RESULT REVIEW (OUTPATIENT)
Age: 83
End: 2023-08-09

## 2023-08-10 ENCOUNTER — APPOINTMENT (OUTPATIENT)
Dept: HEMATOLOGY ONCOLOGY | Facility: CLINIC | Age: 83
End: 2023-08-10
Payer: MEDICARE

## 2023-08-10 ENCOUNTER — RESULT REVIEW (OUTPATIENT)
Age: 83
End: 2023-08-10

## 2023-08-10 VITALS
RESPIRATION RATE: 17 BRPM | HEART RATE: 65 BPM | BODY MASS INDEX: 25.82 KG/M2 | HEIGHT: 59 IN | TEMPERATURE: 95.6 F | WEIGHT: 128.06 LBS | OXYGEN SATURATION: 96 % | SYSTOLIC BLOOD PRESSURE: 118 MMHG | DIASTOLIC BLOOD PRESSURE: 59 MMHG

## 2023-08-10 PROCEDURE — 38220 DX BONE MARROW ASPIRATIONS: CPT | Mod: RT

## 2023-08-10 PROCEDURE — 38221 DX BONE MARROW BIOPSIES: CPT

## 2023-08-10 NOTE — ASSESSMENT
[FreeTextEntry1] : Ms. Valladares is an 82 year old female with a PMH of lymphoma supposedly mantle cell according to son (dx 20 yrs ago), left sided breast lumpectomy, CAD (with stent placement in 2021), hypertension, anxiety, lumbar spinal stenosis who presents with generalized fatigue, chills, nausea, poor oral intake with weight loss, 20 lbs weight loss for the past month recently admitted to [facility] for evaluation and management, found to have hyponatremia and NIKKI. Was seen in hospital for leukocytosis  #Leukocytosis 2/2 mantle lymphoma (supposedly) - Peripheral blood, flow cytometric immunophenotyping: Involved by a B-cell lymphoproliferative disorder with partial CD5 expression, kappa light chain-restricted.   - The differential diagnosis for further subclassification of this process includes marginal zone lymphoma, lymphoplasmacytic lymphoma, follicular lymphoma, and possibly chronic lymphocytic leukemia/small lymphocytic lymphoma or mantle cell lymphoma.   - s/p PET/CT 10/2022 revealing left upper lobe patchy groundglass opacities with mild FDG uptake to SUV 3.0, likely of nonspecific infectious/inflammatory etiology. No additional abnormal foci of FDG uptake to suggest biologic tumor activity. - No B symptoms  - If consistent with MCL, patients with low tumor burden, low-risk , disease may have an indolent course, managed by observation. - May consider BR in future if needed.  - Currently does not have indication for treatment as there is no anemia, thrombocytopenia, B symptoms, bulky adenopathy. - 5/26/23 vs and CBC reviewed, WBC 66.95, hgb 12.5, plt 236. Reviewed case with Dr. Tran. Reviewed trend of WBC with WBC doubling time of 5 mos. IGVH mutational analysis and FISH CLL panel to be completed today. Reviewed PET/CT. Repeat CT Chest to evaluate ground class opacities. Continues to deny B symptoms. No adenopathy on exam. Weight stable. LDH now slightly elevated at 237. RTC in 6 weeks to monitor blood counts more closely. Repeat flow at next visit. Plan of care discussed with patient and brother Aaron.  - 8/10/23 BMBx today . wbc 91k  #Thyroid nodule - s/p US 9/2022 revealing heterogenous thyroid gland. Recommend US guided FNA L sided nodule  - s/p bx pathology benign Consistent with a follicular/colloid nodule with cystic degeneration  #Health  Maintenance  - PCP Dr. Charlton last seen 3/2/23 note reviewed  - Pain Management Dr. Graf pending cortisone injections  - Urology Dr. Albrecht appt 5/26/23  - Nephrology Dr. Vivar last seen 2/2023 not reviewed  - CNY 2016, given family hx recommend follow up with GI to assess need for another CNY  - Mammo/Sono 6/2022 BIRADS 2

## 2023-08-10 NOTE — PROCEDURE
[Bone Marrow Biopsy] : bone marrow biopsy [Bone Marrow Aspiration] : bone marrow aspiration  [Patient] : the patient [Verbal Consent Obtained] : verbal consent was obtained prior to the procedure [Patient identification verified] : patient identification verified [Procedure verified and consent obtained] : procedure verified and consent obtained [Laterality verified and correct site marked] : laterality verified and correct site marked [Right] : site: right [Correct positioning] : correct positioning [Correct implant and/ or special equipment obtained] : correct impact and/ or special equipment obtained [Prone] : prone [Superior iliac spine was identified] : the superior iliac spine was identified. [The right posterior iliac crest was prepped with betadine and draped, using sterile technique.] : The right posterior iliac crest was prepped with betadine and draped, using sterile technique. [Aspirate] : aspirate [Cytogenetics] : cytogenetics [FISH] : FISH [Biopsy] : biopsy [Flow Cytometry] : flow cytometry [] : The patient was instructed to remove the bandage the following AM. The patient may bathe. Acetaminophen may be taken for discomfort, as per package directions.If there are any other problems, the patient was instructed to call the office. The patient verbalized understanding, and is aware of the office contact numbers. [FreeTextEntry1] : CD5 + lymphroliferative disorder [FreeTextEntry2] : Princess Sears DO, FACKAYE, FACP Medical Oncology and Hematology [facility]

## 2023-08-10 NOTE — HISTORY OF PRESENT ILLNESS
[de-identified] : Ms. Valladares is an 82 year old woman who presents as a hospital follow up.\par  \par  She presented to [place] ED 2022 with generalized fatigue, chills, nausea, poor PO intake, and 20lb weight loss over 1 month\par  \par  Blood work during hospitalization revealed WBC 25-32, hgb 10-1, lymphocytes up to 80%, NIKKI, , K:L 2.04, \par  \par  Flow: Peripheral blood, flow cytometric immunophenotyping: Involved by a B-cell lymphoproliferative disorder with partial CD5 expression, kappa light chain-restricted.  The\par  differential diagnosis for further subclassification of this process includes marginal zone lymphoma, lymphoplasmacytic lymphoma, follicular lymphoma, and possibly chronic lymphocytic leukemia/small lymphocytic lymphoma or mantle cell lymphoma.  In addition, cyclin D1 immunohistochemistry on a bone marrow biopsy or FISH forCCND1/IGH might be considered.  If FISH testing is desired on this specimen, please call the signing pathologist.\par  \par  WBC:  21.9 x 10(9)/L\par  %Lymphs (CBC/automated differential):  75%\par  #Lymphs (CBC/automated differential):  16.5 x 10(9)/L\par  Results:\par  Blasts:  Not increased by CD45/side scatter and CD34.\par  B-cells:  Monotypic kappa\par  \par  Age of Menarche: 13\par  Age of Menopause: 50s\par  OCP/HRT: OCP prior to menopause, no HRT.\par  \par  \par  Health Maintenance:\par  Mammo: 3/2022, q6months\par  GYN:\par  CNY: 2016\par  DEXA: unknown when last done. \par  She has a personal history of lymphoma dx 20 years ago and was managed by PCP.\par  PMH: CAD w/ PCI, HTN, Breast Cancer  s/p lumpectomy/RT/Tamoxifen, ? Mantle Cell Lymphoma.  [de-identified] : Patient presents today for bm bx

## 2023-08-10 NOTE — PHYSICAL EXAM
[Ambulatory and capable of all self care but unable to carry out any work activities] : Status 2- Ambulatory and capable of all self care but unable to carry out any work activities. Up and about more than 50% of waking hours [Normal] : affect appropriate [de-identified] : no adenopathy  [de-identified] : no axillary adenopathy  [de-identified] : no inquinal adenopathy

## 2023-08-10 NOTE — REVIEW OF SYSTEMS
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

## 2023-08-11 ENCOUNTER — APPOINTMENT (OUTPATIENT)
Dept: UROLOGY | Facility: HOSPITAL | Age: 83
End: 2023-08-11

## 2023-08-18 ENCOUNTER — APPOINTMENT (OUTPATIENT)
Dept: HEMATOLOGY ONCOLOGY | Facility: CLINIC | Age: 83
End: 2023-08-18

## 2023-08-30 NOTE — PROCEDURE
[Bone Marrow Biopsy] : bone marrow biopsy [Bone Marrow Aspiration] : bone marrow aspiration  [Patient] : the patient [Verbal Consent Obtained] : verbal consent was obtained prior to the procedure [Patient identification verified] : patient identification verified [Procedure verified and consent obtained] : procedure verified and consent obtained [Laterality verified and correct site marked] : laterality verified and correct site marked [Right] : site: right [Correct positioning] : correct positioning [Correct implant and/ or special equipment obtained] : correct impact and/ or special equipment obtained [Prone] : prone [Superior iliac spine was identified] : the superior iliac spine was identified. [The right posterior iliac crest was prepped with betadine and draped, using sterile technique.] : The right posterior iliac crest was prepped with betadine and draped, using sterile technique. [Aspirate] : aspirate [FISH] : FISH [Cytogenetics] : cytogenetics [Biopsy] : biopsy [Flow Cytometry] : flow cytometry [] : The patient was instructed to remove the bandage the following AM. The patient may bathe. Acetaminophen may be taken for discomfort, as per package directions.If there are any other problems, the patient was instructed to call the office. The patient verbalized understanding, and is aware of the office contact numbers. [FreeTextEntry1] : CD5 + lymphroliferative disorder [FreeTextEntry2] : Princess Sears DO, FACKAYE, FACP Medical Oncology and Hematology [facility]

## 2023-08-31 ENCOUNTER — APPOINTMENT (OUTPATIENT)
Dept: UROLOGY | Facility: CLINIC | Age: 83
End: 2023-08-31

## 2023-09-06 ENCOUNTER — RESULT REVIEW (OUTPATIENT)
Age: 83
End: 2023-09-06

## 2023-09-06 ENCOUNTER — APPOINTMENT (OUTPATIENT)
Dept: HEMATOLOGY ONCOLOGY | Facility: CLINIC | Age: 83
End: 2023-09-06
Payer: MEDICARE

## 2023-09-06 VITALS
HEIGHT: 59 IN | TEMPERATURE: 97.7 F | OXYGEN SATURATION: 98 % | RESPIRATION RATE: 16 BRPM | SYSTOLIC BLOOD PRESSURE: 101 MMHG | WEIGHT: 127 LBS | DIASTOLIC BLOOD PRESSURE: 61 MMHG | BODY MASS INDEX: 25.6 KG/M2 | HEART RATE: 60 BPM

## 2023-09-06 PROCEDURE — 99215 OFFICE O/P EST HI 40 MIN: CPT | Mod: 25

## 2023-09-06 PROCEDURE — 36415 COLL VENOUS BLD VENIPUNCTURE: CPT

## 2023-09-06 RX ORDER — VIBEGRON 75 MG/1
75 TABLET, FILM COATED ORAL
Qty: 30 | Refills: 1 | Status: COMPLETED | COMMUNITY
Start: 2023-05-26 | End: 2023-09-06

## 2023-09-06 NOTE — HISTORY OF PRESENT ILLNESS
[de-identified] : Ms. 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  The\par  differential diagnosis for further subclassification of this process includes marginal zone lymphoma, lymphoplasmacytic lymphoma, follicular lymphoma, and possibly chronic lymphocytic leukemia/small lymphocytic lymphoma or mantle cell lymphoma.  In addition, cyclin D1 immunohistochemistry on a bone marrow biopsy or FISH forCCND1/IGH might be considered.  If FISH testing is desired on this specimen, please call the signing pathologist.\par  \par  WBC:  21.9 x 10(9)/L\par  %Lymphs (CBC/automated differential):  75%\par  #Lymphs (CBC/automated differential):  16.5 x 10(9)/L\par  Results:\par  Blasts:  Not increased by CD45/side scatter and CD34.\par  B-cells:  Monotypic kappa\par  \par  Age of Menarche: 13\par  Age of Menopause: 50s\par  OCP/HRT: OCP prior to menopause, no HRT.\par  \par  \par  Health Maintenance:\par  Mammo: 3/2022, q6months\par  GYN:\par  CNY: 2016\par  DEXA: unknown when last done. \par  She has a personal history of lymphoma dx 20 years ago and was managed by PCP.\par  PMH: CAD w/ PCI, HTN, Breast Cancer  s/p lumpectomy/RT/Tamoxifen, ? Mantle Cell Lymphoma.  [de-identified] : Patient presents today for f/u on BMB results States overall feeling well but getting up a lot during the night to urinate States went to a urologist and was given a new medications but was increasing her blood pressure so no longer on that medication

## 2023-09-06 NOTE — REVIEW OF SYSTEMS
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

## 2023-09-06 NOTE — ASSESSMENT
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   - The differential diagnosis for further subclassification of this process includes marginal zone lymphoma, lymphoplasmacytic lymphoma, follicular lymphoma, and possibly chronic lymphocytic leukemia/small lymphocytic lymphoma or mantle cell lymphoma.   - s/p PET/CT 10/2022 revealing left upper lobe patchy groundglass opacities with mild FDG uptake to SUV 3.0, likely of nonspecific infectious/inflammatory etiology. No additional abnormal foci of FDG uptake to suggest biologic tumor activity. - No B symptoms  - If consistent with MCL, patients with low tumor burden, low-risk , disease may have an indolent course, managed by observation. - May consider BR in future if needed.  - Currently does not have indication for treatment as there is no anemia, thrombocytopenia, B symptoms, bulky adenopathy. - 5/26/23 vs and CBC reviewed, WBC 66.95, hgb 12.5, plt 236. Reviewed case with Dr. Tran. Reviewed trend of WBC with WBC doubling time of 5 mos. I[facility] mutational analysis and FISH CLL panel to be completed today. Reviewed PET/CT. Repeat CT Chest to evaluate ground class opacities. Continues to deny B symptoms. No adenopathy on exam. Weight stable. LDH now slightly elevated at 237. RTC in 6 weeks to monitor blood counts more closely. Repeat flow at next visit. Plan of care discussed with patient and brother Aaron.  - 8/10/23 BMBx today . wbc 91k 9/6/23 - vs and labs reviewed. labs drawn in office today. wbc 103k, hgb 12.3, plts 232. BM bx with MCL 70-80% involvement. 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  MIPI - high risk. Recommend Bendamustine - 90 mg/m intravenously over 30-60 minutes on days 1 and 2 of a four-week cycle and Rituximab - 375 mg/m intravenously on day 1 of each cycle up to 6 cycles,followed by maintenance rituxan q8 weeks for 2-3 years. Check repeat PET CT as counts have been consistently rising. Another option could be Rituximab - 375 mg/m2 once weekly for the first 4 weeks and Lenalidomide - 20 mg daily on days 1 through 21 of every 28-day cycle for 12 cycles. Discussed side effect profile of each.   #Thyroid nodule - s/p US 9/2022 revealing heterogenous thyroid gland. Recommend US guided FNA L sided nodule  - s/p bx pathology benign Consistent with a follicular/colloid nodule with cystic degeneration  #Health  Maintenance  - PCP Dr. Charlton last seen 3/2/23 note reviewed , seeing 9/23 - Pain Management Dr. Graf pending cortisone injections  - Urology Dr. Albrecht appt 5/26/23  - CNY 2016, given family hx recommend follow up with GI to assess need for another CNY  - Mammo/Sono - needs repeat

## 2023-09-06 NOTE — PHYSICAL EXAM
[Ambulatory and capable of all self care but unable to carry out any work activities] : Status 2- Ambulatory and capable of all self care but unable to carry out any work activities. Up and about more than 50% of waking hours [Normal] : affect appropriate [de-identified] : no adenopathy  [de-identified] : no axillary adenopathy  [de-identified] : no inquinal adenopathy

## 2023-09-08 ENCOUNTER — APPOINTMENT (OUTPATIENT)
Dept: FAMILY MEDICINE | Facility: CLINIC | Age: 83
End: 2023-09-08
Payer: MEDICARE

## 2023-09-08 VITALS
BODY MASS INDEX: 24.19 KG/M2 | HEART RATE: 68 BPM | OXYGEN SATURATION: 97 % | HEIGHT: 59 IN | SYSTOLIC BLOOD PRESSURE: 118 MMHG | DIASTOLIC BLOOD PRESSURE: 70 MMHG | TEMPERATURE: 97.7 F | WEIGHT: 120 LBS

## 2023-09-08 DIAGNOSIS — Z00.00 ENCOUNTER FOR GENERAL ADULT MEDICAL EXAMINATION W/OUT ABNORMAL FINDINGS: ICD-10-CM

## 2023-09-08 DIAGNOSIS — M48.061 SPINAL STENOSIS, LUMBAR REGION WITHOUT NEUROGENIC CLAUDICATION: ICD-10-CM

## 2023-09-08 DIAGNOSIS — E78.5 HYPERLIPIDEMIA, UNSPECIFIED: ICD-10-CM

## 2023-09-08 DIAGNOSIS — E87.1 HYPO-OSMOLALITY AND HYPONATREMIA: ICD-10-CM

## 2023-09-08 DIAGNOSIS — N32.81 OVERACTIVE BLADDER: ICD-10-CM

## 2023-09-08 DIAGNOSIS — E55.9 VITAMIN D DEFICIENCY, UNSPECIFIED: ICD-10-CM

## 2023-09-08 PROCEDURE — 99397 PER PM REEVAL EST PAT 65+ YR: CPT | Mod: 25

## 2023-09-08 PROCEDURE — 36415 COLL VENOUS BLD VENIPUNCTURE: CPT

## 2023-09-08 NOTE — HEALTH RISK ASSESSMENT
[No] : No [No falls in past year] : Patient reported no falls in the past year [0] : 2) Feeling down, depressed, or hopeless: Not at all (0) [[id number]] : 0 [Patient declined mammogram] : Patient declined mammogram [Patient declined PAP Smear] : Patient declined PAP Smear [Patient declined bone density test] : Patient declined bone density test [Patient declined colonoscopy] : Patient declined colonoscopy [Fully functional (bathing, dressing, toileting, transferring, walking, feeding)] : Fully functional (bathing, dressing, toileting, transferring, walking, feeding) [Fully functional (using the telephone, shopping, preparing meals, housekeeping, doing laundry, using] : Fully functional and needs no help or supervision to perform IADLs (using the telephone, shopping, preparing meals, housekeeping, doing laundry, using transportation, managing medications and managing finances) [Never] : Never

## 2023-09-20 PROBLEM — E78.5 HLD (HYPERLIPIDEMIA): Status: ACTIVE | Noted: 2023-05-23

## 2023-09-20 PROBLEM — Z00.00 ENCOUNTER FOR PREVENTIVE HEALTH EXAMINATION: Status: ACTIVE | Noted: 2022-08-18

## 2023-09-20 PROBLEM — E55.9 VITAMIN D DEFICIENCY: Status: ACTIVE | Noted: 2022-08-30

## 2023-09-20 PROBLEM — N32.81 OVERACTIVE BLADDER: Status: ACTIVE | Noted: 2023-03-02

## 2023-09-20 PROBLEM — E87.1 HYPONATREMIA: Status: ACTIVE | Noted: 2022-09-12

## 2023-09-20 PROBLEM — M48.061 LUMBAR SPINAL STENOSIS: Status: ACTIVE | Noted: 2022-08-30

## 2023-09-21 ENCOUNTER — APPOINTMENT (OUTPATIENT)
Dept: GASTROENTEROLOGY | Facility: CLINIC | Age: 83
End: 2023-09-21
Payer: MEDICARE

## 2023-09-21 DIAGNOSIS — Z95.5 PRESENCE OF CORONARY ANGIOPLASTY IMPLANT AND GRAFT: ICD-10-CM

## 2023-09-21 DIAGNOSIS — Z12.11 ENCOUNTER FOR SCREENING FOR MALIGNANT NEOPLASM OF COLON: ICD-10-CM

## 2023-09-21 DIAGNOSIS — I25.10 ATHEROSCLEROTIC HEART DISEASE OF NATIVE CORONARY ARTERY W/OUT ANGINA PECTORIS: ICD-10-CM

## 2023-09-21 PROCEDURE — 99213 OFFICE O/P EST LOW 20 MIN: CPT | Mod: 95

## 2023-10-02 ENCOUNTER — RESULT REVIEW (OUTPATIENT)
Age: 83
End: 2023-10-02

## 2023-10-02 ENCOUNTER — NON-APPOINTMENT (OUTPATIENT)
Age: 83
End: 2023-10-02

## 2023-10-18 ENCOUNTER — APPOINTMENT (OUTPATIENT)
Dept: HEMATOLOGY ONCOLOGY | Facility: CLINIC | Age: 83
End: 2023-10-18
Payer: MEDICARE

## 2023-10-18 ENCOUNTER — RESULT REVIEW (OUTPATIENT)
Age: 83
End: 2023-10-18

## 2023-10-18 VITALS
RESPIRATION RATE: 16 BRPM | DIASTOLIC BLOOD PRESSURE: 75 MMHG | BODY MASS INDEX: 25.43 KG/M2 | WEIGHT: 126.13 LBS | SYSTOLIC BLOOD PRESSURE: 139 MMHG | OXYGEN SATURATION: 96 % | TEMPERATURE: 97.1 F | HEART RATE: 69 BPM | HEIGHT: 59 IN

## 2023-10-18 DIAGNOSIS — E04.1 NONTOXIC SINGLE THYROID NODULE: ICD-10-CM

## 2023-10-18 DIAGNOSIS — F32.A ANXIETY DISORDER, UNSPECIFIED: ICD-10-CM

## 2023-10-18 DIAGNOSIS — F41.9 ANXIETY DISORDER, UNSPECIFIED: ICD-10-CM

## 2023-10-18 PROCEDURE — 36415 COLL VENOUS BLD VENIPUNCTURE: CPT

## 2023-10-18 PROCEDURE — 99215 OFFICE O/P EST HI 40 MIN: CPT | Mod: 25

## 2023-10-30 ENCOUNTER — NON-APPOINTMENT (OUTPATIENT)
Age: 83
End: 2023-10-30

## 2023-11-03 ENCOUNTER — RESULT REVIEW (OUTPATIENT)
Age: 83
End: 2023-11-03

## 2023-11-06 ENCOUNTER — APPOINTMENT (OUTPATIENT)
Dept: HEMATOLOGY ONCOLOGY | Facility: CLINIC | Age: 83
End: 2023-11-06
Payer: MEDICARE

## 2023-11-06 ENCOUNTER — RESULT REVIEW (OUTPATIENT)
Age: 83
End: 2023-11-06

## 2023-11-06 ENCOUNTER — NON-APPOINTMENT (OUTPATIENT)
Age: 83
End: 2023-11-06

## 2023-11-06 VITALS
OXYGEN SATURATION: 96 % | HEART RATE: 65 BPM | WEIGHT: 126 LBS | RESPIRATION RATE: 16 BRPM | DIASTOLIC BLOOD PRESSURE: 60 MMHG | BODY MASS INDEX: 25.4 KG/M2 | SYSTOLIC BLOOD PRESSURE: 118 MMHG | HEIGHT: 59 IN | TEMPERATURE: 97 F

## 2023-11-06 PROCEDURE — 36415 COLL VENOUS BLD VENIPUNCTURE: CPT

## 2023-11-06 PROCEDURE — 99214 OFFICE O/P EST MOD 30 MIN: CPT | Mod: 25

## 2023-11-08 ENCOUNTER — NON-APPOINTMENT (OUTPATIENT)
Age: 83
End: 2023-11-08

## 2023-11-13 ENCOUNTER — APPOINTMENT (OUTPATIENT)
Dept: HEMATOLOGY ONCOLOGY | Facility: CLINIC | Age: 83
End: 2023-11-13

## 2023-11-14 ENCOUNTER — APPOINTMENT (OUTPATIENT)
Dept: ENDOCRINOLOGY | Facility: CLINIC | Age: 83
End: 2023-11-14

## 2023-12-04 ENCOUNTER — NON-APPOINTMENT (OUTPATIENT)
Age: 83
End: 2023-12-04

## 2023-12-04 ENCOUNTER — RESULT REVIEW (OUTPATIENT)
Age: 83
End: 2023-12-04

## 2023-12-04 ENCOUNTER — APPOINTMENT (OUTPATIENT)
Dept: HEMATOLOGY ONCOLOGY | Facility: CLINIC | Age: 83
End: 2023-12-04
Payer: MEDICARE

## 2023-12-04 VITALS
OXYGEN SATURATION: 98 % | WEIGHT: 123 LBS | BODY MASS INDEX: 24.8 KG/M2 | DIASTOLIC BLOOD PRESSURE: 63 MMHG | HEART RATE: 64 BPM | RESPIRATION RATE: 16 BRPM | HEIGHT: 59 IN | TEMPERATURE: 97 F | SYSTOLIC BLOOD PRESSURE: 133 MMHG

## 2023-12-04 DIAGNOSIS — D72.829 ELEVATED WHITE BLOOD CELL COUNT, UNSPECIFIED: ICD-10-CM

## 2023-12-04 PROCEDURE — 99214 OFFICE O/P EST MOD 30 MIN: CPT | Mod: 25

## 2023-12-04 PROCEDURE — 36415 COLL VENOUS BLD VENIPUNCTURE: CPT

## 2023-12-04 RX ORDER — CLOPIDOGREL BISULFATE 75 MG/1
75 TABLET, FILM COATED ORAL
Refills: 0 | Status: DISCONTINUED | COMMUNITY
End: 2023-12-04

## 2023-12-05 ENCOUNTER — RESULT REVIEW (OUTPATIENT)
Age: 83
End: 2023-12-05

## 2023-12-05 ENCOUNTER — NON-APPOINTMENT (OUTPATIENT)
Age: 83
End: 2023-12-05

## 2023-12-05 PROBLEM — F41.9 ANXIETY AND DEPRESSION: Status: ACTIVE | Noted: 2022-08-30

## 2023-12-05 PROBLEM — E04.1 THYROID NODULE: Status: ACTIVE | Noted: 2022-09-12

## 2023-12-11 ENCOUNTER — RX RENEWAL (OUTPATIENT)
Age: 83
End: 2023-12-11

## 2023-12-11 RX ORDER — CARVEDILOL 3.12 MG/1
3.12 TABLET, FILM COATED ORAL TWICE DAILY
Qty: 180 | Refills: 1 | Status: ACTIVE | COMMUNITY
Start: 2022-09-13 | End: 1900-01-01

## 2023-12-12 ENCOUNTER — TRANSCRIPTION ENCOUNTER (OUTPATIENT)
Age: 83
End: 2023-12-12

## 2023-12-14 PROBLEM — D72.829 LEUKOCYTOSIS: Status: ACTIVE | Noted: 2022-09-13

## 2023-12-14 NOTE — PHYSICAL EXAM
[de-identified] : no adenopathy  [de-identified] : no axillary adenopathy  [de-identified] : no inquinal adenopathy

## 2023-12-14 NOTE — HISTORY OF PRESENT ILLNESS
[de-identified] : Ms. 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  The\par  differential diagnosis for further subclassification of this process includes marginal zone lymphoma, lymphoplasmacytic lymphoma, follicular lymphoma, and possibly chronic lymphocytic leukemia/small lymphocytic lymphoma or mantle cell lymphoma.  In addition, cyclin D1 immunohistochemistry on a bone marrow biopsy or FISH forCCND1/IGH might be considered.  If FISH testing is desired on this specimen, please call the signing pathologist.\par  \par  WBC:  21.9 x 10(9)/L\par  %Lymphs (CBC/automated differential):  75%\par  #Lymphs (CBC/automated differential):  16.5 x 10(9)/L\par  Results:\par  Blasts:  Not increased by CD45/side scatter and CD34.\par  B-cells:  Monotypic kappa\par  \par  Age of Menarche: 13\par  Age of Menopause: 50s\par  OCP/HRT: OCP prior to menopause, no HRT.\par  \par  \par  Health Maintenance:\par  Mammo: 3/2022, q6months\par  GYN:\par  CNY: 2016\par  DEXA: unknown when last done. \par  She has a personal history of lymphoma dx 20 years ago and was managed by PCP.\par  PMH: CAD w/ PCI, HTN, Breast Cancer  s/p lumpectomy/RT/Tamoxifen, ? Mantle Cell Lymphoma.  [de-identified] : Patient presents today for routine follow up. Due to start D1 of D1 and D2 ritux/bendamustane today. She reports feeling well overall. States she feels overall well. Denies fevers/chills, night sweats, HA, dizziness, SOB, cough, CP, palpitations, abd pain, n/v/d, swelling to extremities, back pain, hematuria, BRBPR, abnormal bleeding.

## 2023-12-14 NOTE — HISTORY OF PRESENT ILLNESS
[de-identified] : Ms. 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  The\par  differential diagnosis for further subclassification of this process includes marginal zone lymphoma, lymphoplasmacytic lymphoma, follicular lymphoma, and possibly chronic lymphocytic leukemia/small lymphocytic lymphoma or mantle cell lymphoma.  In addition, cyclin D1 immunohistochemistry on a bone marrow biopsy or FISH forCCND1/IGH might be considered.  If FISH testing is desired on this specimen, please call the signing pathologist.\par  \par  WBC:  21.9 x 10(9)/L\par  %Lymphs (CBC/automated differential):  75%\par  #Lymphs (CBC/automated differential):  16.5 x 10(9)/L\par  Results:\par  Blasts:  Not increased by CD45/side scatter and CD34.\par  B-cells:  Monotypic kappa\par  \par  Age of Menarche: 13\par  Age of Menopause: 50s\par  OCP/HRT: OCP prior to menopause, no HRT.\par  \par  \par  Health Maintenance:\par  Mammo: 3/2022, q6months\par  GYN:\par  CNY: 2016\par  DEXA: unknown when last done. \par  She has a personal history of lymphoma dx 20 years ago and was managed by PCP.\par  PMH: CAD w/ PCI, HTN, Breast Cancer  s/p lumpectomy/RT/Tamoxifen, ? Mantle Cell Lymphoma.  [de-identified] : Patient presents today for routine follow up. Due to start C2 D1 of D1 and D2 ritux/bendamustane today. She reports feeling well overall but did note that about 2 weeks following last treatment she noticed fatigue and weakness. She also noticed some swelling to her feet but she does not she has swelling at baseline. She is s/p follow up with cardio and states plavix was d/c and now on ASA.   States she feels overall well well today and has gained her energy back. She is ambulating at baseline with her walker. Denies fevers/chills, night sweats, HA, dizziness, SOB, cough, CP, palpitations, abd pain, n/v/d, swelling to extremities, back pain, hematuria, BRBPR, abnormal bleeding.

## 2023-12-14 NOTE — ASSESSMENT
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 - The differential diagnosis for further subclassification of this process includes marginal zone lymphoma, lymphoplasmacytic lymphoma, follicular lymphoma, and possibly chronic lymphocytic leukemia/small lymphocytic lymphoma or mantle cell lymphoma. - s/p PET/CT 10/2022 revealing left upper lobe patchy groundglass opacities with mild FDG uptake to SUV 3.0, likely of nonspecific infectious/inflammatory etiology. No additional abnormal foci of FDG uptake to suggest biologic tumor activity. - No B symptoms - If consistent with MCL, patients with low tumor burden, low-risk , disease may have an indolent course, managed by observation. - May consider BR in future if needed. - Currently does not have indication for treatment as there is no anemia, thrombocytopenia, B symptoms, bulky adenopathy. - 5/26/23 vs and CBC reviewed, WBC 66.95, hgb 12.5, plt 236. Reviewed case with Dr. Tran. Reviewed trend of WBC with WBC doubling time of 5 mos. [facility] mutational analysis and FISH CLL panel to be completed today. Reviewed PET/CT. Repeat CT Chest to evaluate ground class opacities. Continues to deny B symptoms. No adenopathy on exam. Weight stable. LDH now slightly elevated at 237. RTC in 6 weeks to monitor blood counts more closely. Repeat flow at next visit. Plan of care discussed with patient and brother Aaron. - 8/10/23 BMBx today. wbc 91k - 9/6/23 - vs and labs reviewed. labs drawn in office today. wbc 103k, hgb 12.3, plts 232. BM bx with MCL 70-80% involvement. 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 MIPI - high risk. Recommend Bendamustine - 90 mg/m intravenously over 30-60 minutes on days 1 and 2 of a four-week cycle and Rituximab - 375 mg/m intravenously on day 1 of each cycle up to 6 cycles,followed by maintenance rituxan q8 weeks for 2-3 years. Check repeat PET CT as counts have been consistently rising. Another option could be Rituximab - 375 mg/m2 once weekly for the first 4 weeks and Lenalidomide - 20 mg daily on days 1 through 21 of every 28-day cycle for 12 cycles. Discussed side effect profile of each. - 10/18/23 vs and CBC reviewed; .83, hgb 13.3, plt 247. Extensive discussion with patient and brother regarding plan of care. Mantle cell lymphoma based on BMBx involving bone marrow. s/p PET/CT 10/2/23 revealing Borderline enlarged spleen. No evidence of FDG avid malignancy. Given high risk score and continued increase to WBC, recommend treatment. Again reviewed both regimens as above. Patient prefers to not take pills daily and also lives far and need transportation from [facility]. She prefers Ritux/ Bendamustane regimen given monthlyl regimen D1/D2. Reviewed Bendamustine - 90 mg/m intravenously over 30-60 minutes on days 1 and 2 of a four-week cycle and Rituximab - 375 mg/m intravenously on day 1 of each cycle up to 6 cycles,followed by maintenance rituxan q8 weeks for 2-3 years. Reviewed side effects and logistics. Hand outs provided to patient for review. Will need port placement. Order entered. Hepatitis negative. Will send EMLA and Zofran. - 11/6/23 vs and CBC reviewed; .52, hgb 12.3, plt 205. Due to start D1/D2 ritux/bendamustane today. Again reviewed side effects. s/p port placement. Sent EMLA cream and Zofran. Proceed with treatment today. Given patient does not live close will arrange for [facility] labs to check labs in 1-2 weeks following C1.   #Thyroid nodule - s/p US 9/2022 revealing heterogenous thyroid gland. Recommend US guided FNA L sided nodule - s/p bx pathology benign Consistent with a follicular/colloid nodule with cystic degeneration  #Health Maintenance - PCP Dr. Charlton  - Pain Management Dr. Graf pending cortisone injections - Urology Dr. Albrecht  - CNY 2016, given family hx recommend follow up with GI to assess need for another CNY, s/p follow up with GI  - Mammo/Sono needs repeat, PET/CT without evidence of concern  [facility] labs in 1-2 weeks for tox check with CBC with diff, CMP, mg, phos, LDH, ESR/CRP  RTC 4 weeks with CBC with diff, CMP, Mg, Phos, LDH, ESR/CRP  Case and management discussed with .

## 2023-12-14 NOTE — ASSESSMENT
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 - The differential diagnosis for further subclassification of this process includes marginal zone lymphoma, lymphoplasmacytic lymphoma, follicular lymphoma, and possibly chronic lymphocytic leukemia/small lymphocytic lymphoma or mantle cell lymphoma. - s/p PET/CT 10/2022 revealing left upper lobe patchy groundglass opacities with mild FDG uptake to SUV 3.0, likely of nonspecific infectious/inflammatory etiology. No additional abnormal foci of FDG uptake to suggest biologic tumor activity. - No B symptoms - If consistent with MCL, patients with low tumor burden, low-risk , disease may have an indolent course, managed by observation. - May consider BR in future if needed. - Currently does not have indication for treatment as there is no anemia, thrombocytopenia, B symptoms, bulky adenopathy. - 5/26/23 vs and CBC reviewed, WBC 66.95, hgb 12.5, plt 236. Reviewed case with Dr. Tran. Reviewed trend of WBC with WBC doubling time of 5 mos. IGVH mutational analysis and FISH CLL panel to be completed today. Reviewed PET/CT. Repeat CT Chest to evaluate ground class opacities. Continues to deny B symptoms. No adenopathy on exam. Weight stable. LDH now slightly elevated at 237. RTC in 6 weeks to monitor blood counts more closely. Repeat flow at next visit. Plan of care discussed with patient and brother Aaron. - 8/10/23 BMBx today. wbc 91k - 9/6/23 - vs and labs reviewed. labs drawn in office today. wbc 103k, hgb 12.3, plts 232. BM bx with MCL 70-80% involvement. 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 MIPI - high risk. Recommend Bendamustine - 90 mg/m intravenously over 30-60 minutes on days 1 and 2 of a four-week cycle and Rituximab - 375 mg/m intravenously on day 1 of each cycle up to 6 cycles,followed by maintenance rituxan q8 weeks for 2-3 years. Check repeat PET CT as counts have been consistently rising. Another option could be Rituximab - 375 mg/m2 once weekly for the first 4 weeks and Lenalidomide - 20 mg daily on days 1 through 21 of every 28-day cycle for 12 cycles. Discussed side effect profile of each. - 10/18/23 vs and CBC reviewed; .83, hgb 13.3, plt 247. Extensive discussion with patient and brother regarding plan of care. Mantle cell lymphoma based on BMBx involving bone marrow. s/p PET/CT 10/2/23 revealing Borderline enlarged spleen. No evidence of FDG avid malignancy. Given high risk score and continued increase to WBC, recommend treatment. Again reviewed both regimens as above. Patient prefers to not take pills daily and also lives far and need transportation from [facility]. She prefers Ritux/ Bendamustane regimen given monthlyl regimen D1/D2. Reviewed Bendamustine - 90 mg/m intravenously over 30-60 minutes on days 1 and 2 of a four-week cycle and Rituximab - 375 mg/m intravenously on day 1 of each cycle up to 6 cycles,followed by maintenance rituxan q8 weeks for 2-3 years. Reviewed side effects and logistics. Hand outs provided to patient for review. Will need port placement. Order entered. Hepatitis negative. Will send EMLA and Zofran. - 11/6/23 vs and CBC reviewed; .52, hgb 12.3, plt 205. Due to start D1/D2 ritux/bendamustane today. Again reviewed side effects. s/p port placement. Sent EMLA cream and Zofran. Proceed with treatment today. Given patient does not live close will arrange for [facility] labs to check labs in 1-2 weeks following C1.  - 12/4/23 vs and CBC reviewed; WBC 19.23, hgb 11, plt 167. Patient also had [facility] labs done 2 weeks following treatment. Uric acid 7.4, Repeat today. If remains elevated or above 8 will assess allopurinol should be added. Patient has had significant response to WBC following 1 cycle 110-->19. Vital signs 133/63, HR 64, 97F stable and okay to proceed with treatment. Patient did not have any fevers following last treatment but did have some fatigue and foot swelling following last cycle about 2 weeks after treatment. Energy and fatigue significantly improved and she presents back to her baseline today. Advised to continue to elevate legs. Will order b/l US today to rule out DVT. Based on vital signs and labs okay to proceed with treatment today. Will return tomorrow for D2 bendamustane. To also be seen by [facility] in nutrition.   #Thyroid nodule - s/p US 9/2022 revealing heterogenous thyroid gland. Recommend US guided FNA L sided nodule - s/p bx pathology benign Consistent with a follicular/colloid nodule with cystic degeneration  #Health Maintenance - PCP Dr. Charlton  - Pain Management Dr. Graf pending cortisone injections - Urology Dr. Albrecht  - CNY 2016, given family hx recommend follow up with GI to assess need for another CNY, s/p follow up with GI  - Mammo/Sono needs repeat, PET/CT without evidence of concern  RTC 4 weeks with CBC with diff, CMP, Mg, Phos, LDH, ESR/CRP  Case and management discussed with .

## 2023-12-14 NOTE — PHYSICAL EXAM
[de-identified] : no adenopathy  [de-identified] : no axillary adenopathy  [de-identified] : no inquinal adenopathy

## 2023-12-20 ENCOUNTER — APPOINTMENT (OUTPATIENT)
Dept: FAMILY MEDICINE | Facility: CLINIC | Age: 83
End: 2023-12-20

## 2023-12-20 ENCOUNTER — TRANSCRIPTION ENCOUNTER (OUTPATIENT)
Age: 83
End: 2023-12-20

## 2023-12-21 ENCOUNTER — APPOINTMENT (OUTPATIENT)
Dept: CARDIOLOGY | Facility: CLINIC | Age: 83
End: 2023-12-21

## 2023-12-21 NOTE — CARDIOLOGY SUMMARY
[de-identified] : ****TTE W or WO UEA**** Date: 12/07/23 Order#: [id number]  Pt. Name:       ZENAIDA MONTGOMERY   Study Date:    12/7/2023 MRN:            [id number]         YOB: 1940 Accession #:    [id number]  Age:           83 years Account#:       [id number]          Gender:        F Heart Rate:                      Height:        50.00 in (127.00 cm) Rhythm:                          Weight:        121.00 lb (54.88 kg) Blood Pressure: /                BSA/BMI:       1.32 m / 34.03 kg/m ________________________________________________________________________________________ Referring Physician:    DRE Marni Interpreting Physician: Baldev Vann MD Primary Sonographer:    MIGUEL ÁNGEL  CPT:               ECHO TTE W/O CON F/U LTD - 78701.m Indication(s):     EVAL CARDIAC STATUS Procedure:         Limited transthoracic echocardiogram. Ordering Location: [address]  _______________________________________________________________________________________  CONCLUSIONS:  1. STUTTERING SEPTAL CONTRACTION. (POSSIBLY RELATED TO CONDUCTION ABNORMALITY). MIDSEPTAL AKINESIS. EST. LVEF 50%. GRADE I LV DIASTOLIC DYFCT. 2. NL RV CONTRACTION PATERN. 3. SMALL ANTERIOR AND POSTERIOR PERICARDIAL EFFUSION. 4. TRACE AORTIC REGURG. 5. NO PREV. AVAILABLE FOR COMPARISON.  ________________________________________________________________________________________ FINDINGS:  Left Ventricle: STUTTERING SEPTAL CONTRACTION. ( POSSIBLY RELATED TO CONDUCTION ABNORMALITY). MIDSEPTAL AKINESIS. EST. LVEF 50%. GRADE I LV DIASTOLIC DYFCT.  Right Ventricle: Tricuspid annular plane systolic excursion (TAPSE) is 2.1 cm (normal >=1.7 cm). NL RV CONTRACTION PATERN.  Aortic Valve: The aortic valve is tricuspid with normal leaflet excursion. TRACE AORTIC REGURG.  Pulmonic Valve: There is trace pulmonic regurgitation.  Aorta: The aortic annulus and aortic root appear normal in size.  Pericardium: SMALL ANTERIOR AND POSTRIOR PERICARDIAL EFFUSION. ____________________________________________________________________ QUANTITATIVE DATA: Left Ventricle Measurements: (Indexed to BSA)  IVSd (2D):   1.2 cm LVPWd (2D):  1.2 cm LVIDd (2D):  4.4 cm LVIDs (2D):  3.0 cm LV Mass:     192 g  145.4 g/m LV Vol d, MOD A2C: 53.2 ml 40.24 ml/m LV Vol d, MOD A4C: 52.8 ml 39.95 ml/m LV Vol d, MOD BP:  54.3 ml 41.13 ml/m LV Vol s, MOD A2C: 16.6 ml 12.60 ml/m LV Vol s, MOD A4C: 26.2 ml 19.87 ml/m LV Vol s, MOD BP:  21.6 ml 16.38 ml/m LVOT SV MOD BP:    32.7 ml LV EF% MOD BP:     60 %  MV E Vmax:    0.65 m/s MV A Vmax:    1.23 m/s MV E/A:       0.53 e' lateral:   2.81 cm/s e' medial:    2.87 cm/s E/e' lateral: 23.14 E/e' medial:  22.71 E/e' Average: 22.93 MV DT:        293 msec  Aorta Measurements: (normal range) (Indexed to BSA)  Ao Asc:  3.1 cm Ao Arch: 2.4 cm   Left Atrium Measurements: (Indexed to BSA) LA Diam 2D: 3.20 cm  Right Ventricle Measurements:  TAPSE:      2.1 cm TV [place]. S': 8.81 cm/s   LVOT / RVOT/ Qp/Qs Data: (Indexed to BSA) LVOT Diameter: 1.96 cm LVOT Vmax:     1.07 m/s LVOT VTI:      22.78 cm LVOT SV:       69.0 ml    52.24 ml/m LVOT CO:       4.84 l/min 3.66 l/min/m  Aortic Valve Measurements: AV Vmax:           1.8 m/s AV Peak Gradient:  13.3 mmHg AV Mean Gradient:  7.6 mmHg AV VTI:            38.7 cm AV VTI Ratio:      0.59 AoV EOA, Contin:   1.78 cm AoV EOA, Contin i: 1.35 cm/m  Mitral Valve Measurements:  MV Vmax:       1.45 m/s MV VTI (tips): 27.13 cm MV Mean Grad:  1.41 mmHg MV Peak Grad:  8.4 mmHg MV E Vmax:     0.7 m/s MV A Vmax:     1.2 m/s MV E/A:        0.5   Tricuspid Valve Measurements:  TR Vmax:          2.1 m/s TR Peak Gradient: 17.0 mmHg   Pulmonic Valve Measurments: PV Vmax:          1.3 m/s PV Peak Gradient: 7.1 mmHg  ________________________________________________________________________________________ Electronically signed on 12/7/2023 at 11:19:45 AM by Baldev Vann MD   [de-identified] : Patient Name:	   ZENAIDA MONTGOMERY		Location:	[facility] Rec #: 	  [id number]		Account #: 	[id number]	 YOB: 1940		Ordering:	Tomas Precious DO	 Age: 83	      Sex:    F		Attending:		 PCP:	     Tashi Haider MD ______________________________________________________________________________________ 						 Exam Date: 	  12/05/23					 Exam: 	CT CHEST/ABDOMEN/PELVIS					 Order#:	[id number]					                CT OF CHEST, ABDOMEN AND PELVIS DATED 12/5/2023 9:07 PM  CLINICAL STATEMENT: r/o infectious source  COMPARISON: Right or the radiology splenomegaly doris  TECHNIQUE: Multislice helical sections were obtained from the thoracic inlet to the iliac crests after oral and intravenous contrast administration.  FINDINGS: The PET and minimal dependent bibasilar atelectasis. Bronchiectatic changes at the bilateral lower lobes. No pulmonary consolidation, pneumothorax or pleural effusion.   The mild cardiomegaly. Trace pericardial fluid.  No bulky axillary or mediastinal adenopathy.  The right chest wall Port-A-Cath with tip terminating within the SVC.  Smooth peripheral contour liver. The previously noted within the right hepatic lobe low attenuating focus is not well visualized on this noncontrast study.  The gallbladder and biliary tree are within normal limits.   No peripancreatic inflammatory changes.  No splenomegaly.  Thickening of the left adrenal gland. The right adrenal gland is within normal limits.  No hydronephrosis or obstructing calculus. Urinary bladder is within normal limits. A 2.3 cm left renal cyst.  The uterus is within normal limits.  Short segment of sigmoid colon extends into a left lower quadrant abdominal wall hernia. No obstruction. No pneumoperitoneum. The appendix is not visualized. No evidence to suggest acute appendicitis. No intra-abdominal ascites.  Peripherally calcified structure again noted within the anterior midline lower abdomen, unchanged and possibly an old calcified lymph node  Multiple chronic left lateral rib fractures. Multilevel degenerative changes throughout the spine. Grade 1 anterolisthesis of L5 and S1. Levocurvature.    IMPRESSION: No acute findings. Additional ancillary findings as described above.  --- End of Report ---  ***Electronically Signed *** ----------------------------------------------- Mt Mccord MD              12/05/23 [id number]

## 2023-12-21 NOTE — HISTORY OF PRESENT ILLNESS
[FreeTextEntry1] : 82 y/o F here for initial visit post hospitalization. PMHx mantle cell lymphoma on rituximab and bendamustine , HTN, CAD with stent placement in 2021, anxiety and lumbar spinal stenosis. On 12/5/23 presented to [place] ER and admitted for hypovolemic shock in setting of gram positive cocci on chemotherapy port. During hospital course, found to have pancytopenia in the setting of chemotherapy and started on IV vancomycin. Started on IV fluids which improved blood pressure and NIKKI. Infectious Disease followed, recommending lock therapy with vancomycin in addition to peripheral IV vancomycin for 2 total weeks. Patient completed lock therapy on 12/20 and deemed stable for discharge. Patient to follow up outpatient with oncologist, primary care physician, cardiology and infectious disease.

## 2023-12-27 NOTE — REVIEW OF SYSTEMS
[Anxiety] : anxiety [Fatigue] : fatigue [Easy Bruising] : a tendency for easy bruising [Negative] : Allergic/Immunologic

## 2024-01-02 ENCOUNTER — APPOINTMENT (OUTPATIENT)
Dept: HEMATOLOGY ONCOLOGY | Facility: CLINIC | Age: 84
End: 2024-01-02

## 2024-01-04 ENCOUNTER — RESULT REVIEW (OUTPATIENT)
Age: 84
End: 2024-01-04

## 2024-01-04 ENCOUNTER — APPOINTMENT (OUTPATIENT)
Dept: HEMATOLOGY ONCOLOGY | Facility: CLINIC | Age: 84
End: 2024-01-04
Payer: MEDICARE

## 2024-01-04 VITALS
HEART RATE: 74 BPM | TEMPERATURE: 96.7 F | WEIGHT: 123 LBS | HEIGHT: 59 IN | DIASTOLIC BLOOD PRESSURE: 66 MMHG | SYSTOLIC BLOOD PRESSURE: 128 MMHG | RESPIRATION RATE: 16 BRPM | BODY MASS INDEX: 24.8 KG/M2 | OXYGEN SATURATION: 97 %

## 2024-01-04 DIAGNOSIS — R53.81 OTHER MALAISE: ICD-10-CM

## 2024-01-04 PROCEDURE — 99215 OFFICE O/P EST HI 40 MIN: CPT | Mod: 25

## 2024-01-04 RX ORDER — TURMERIC ROOT EXTRACT 500 MG
TABLET ORAL
Refills: 0 | Status: COMPLETED | COMMUNITY
End: 2024-01-04

## 2024-01-04 RX ORDER — PSYLLIUM HUSK 0.4 G
CAPSULE ORAL
Refills: 0 | Status: COMPLETED | COMMUNITY
End: 2024-01-04

## 2024-01-04 NOTE — HISTORY OF PRESENT ILLNESS
[de-identified] : Ms. Valladares is an 82 year old woman who presents as a hospital follow up.\par  \par  She presented to [place] ED 2022 with generalized fatigue, chills, nausea, poor PO intake, and 20lb weight loss over 1 month\par  \par  Blood work during hospitalization revealed WBC 25-32, hgb 10-1, lymphocytes up to 80%, NIKKI, , K:L 2.04, \par  \par  Flow: Peripheral blood, flow cytometric immunophenotyping: Involved by a B-cell lymphoproliferative disorder with partial CD5 expression, kappa light chain-restricted.  The\par  differential diagnosis for further subclassification of this process includes marginal zone lymphoma, lymphoplasmacytic lymphoma, follicular lymphoma, and possibly chronic lymphocytic leukemia/small lymphocytic lymphoma or mantle cell lymphoma.  In addition, cyclin D1 immunohistochemistry on a bone marrow biopsy or FISH forCCND1/IGH might be considered.  If FISH testing is desired on this specimen, please call the signing pathologist.\par  \par  WBC:  21.9 x 10(9)/L\par  %Lymphs (CBC/automated differential):  75%\par  #Lymphs (CBC/automated differential):  16.5 x 10(9)/L\par  Results:\par  Blasts:  Not increased by CD45/side scatter and CD34.\par  B-cells:  Monotypic kappa\par  \par  Age of Menarche: 13\par  Age of Menopause: 50s\par  OCP/HRT: OCP prior to menopause, no HRT.\par  \par  \par  Health Maintenance:\par  Mammo: 3/2022, q6months\par  GYN:\par  CNY: 2016\par  DEXA: unknown when last done. \par  She has a personal history of lymphoma dx 20 years ago and was managed by PCP.\par  PMH: CAD w/ PCI, HTN, Breast Cancer  s/p lumpectomy/RT/Tamoxifen, ? Mantle Cell Lymphoma.  [de-identified] : Patient presents today for routine follow up. s/p cycle 2 day 1 of BR hospitalized with bacteremia given lock therapy in [place] complains of weakness/ debility

## 2024-01-04 NOTE — PHYSICAL EXAM
[Ambulatory and capable of all self care but unable to carry out any work activities] : Status 2- Ambulatory and capable of all self care but unable to carry out any work activities. Up and about more than 50% of waking hours [Normal] : affect appropriate [de-identified] : no adenopathy  [de-identified] : no axillary adenopathy  [de-identified] : no inquinal adenopathy

## 2024-01-04 NOTE — ASSESSMENT
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 - The differential diagnosis for further subclassification of this process includes marginal zone lymphoma, lymphoplasmacytic lymphoma, follicular lymphoma, and possibly chronic lymphocytic leukemia/small lymphocytic lymphoma or mantle cell lymphoma. - s/p PET/CT 10/2022 revealing left upper lobe patchy groundglass opacities with mild FDG uptake to SUV 3.0, likely of nonspecific infectious/inflammatory etiology. No additional abnormal foci of FDG uptake to suggest biologic tumor activity. - No B symptoms - If consistent with MCL, patients with low tumor burden, low-risk , disease may have an indolent course, managed by observation. - May consider BR in future if needed. - Currently does not have indication for treatment as there is no anemia, thrombocytopenia, B symptoms, bulky adenopathy. - 5/26/23 vs and CBC reviewed, WBC 66.95, hgb 12.5, plt 236. Reviewed case with Dr. Tran. Reviewed trend of WBC with WBC doubling time of 5 mos. IGVH mutational analysis and FISH CLL panel to be completed today. Reviewed PET/CT. Repeat CT Chest to evaluate ground class opacities. Continues to deny B symptoms. No adenopathy on exam. Weight stable. LDH now slightly elevated at 237. RTC in 6 weeks to monitor blood counts more closely. Repeat flow at next visit. Plan of care discussed with patient and brother Aaron. - 8/10/23 BMBx today. wbc 91k - 9/6/23 - vs and labs reviewed. labs drawn in office today. wbc 103k, hgb 12.3, plts 232. BM bx with MCL 70-80% involvement. 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 MIPI - high risk. Recommend Bendamustine - 90 mg/m intravenously over 30-60 minutes on days 1 and 2 of a four-week cycle and Rituximab - 375 mg/m intravenously on day 1 of each cycle up to 6 cycles,followed by maintenance rituxan q8 weeks for 2-3 years. Check repeat PET CT as counts have been consistently rising. Another option could be Rituximab - 375 mg/m2 once weekly for the first 4 weeks and Lenalidomide - 20 mg daily on days 1 through 21 of every 28-day cycle for 12 cycles. Discussed side effect profile of each. - 10/18/23 vs and CBC reviewed; .83, hgb 13.3, plt 247. Extensive discussion with patient and brother regarding plan of care. Mantle cell lymphoma based on BMBx involving bone marrow. s/p PET/CT 10/2/23 revealing Borderline enlarged spleen. No evidence of FDG avid malignancy. Given high risk score and continued increase to WBC, recommend treatment. Again reviewed both regimens as above. Patient prefers to not take pills daily and also lives far and need transportation from [facility]. She prefers Ritux/ Bendamustane regimen given monthlyl regimen D1/D2. Reviewed Bendamustine - 90 mg/m intravenously over 30-60 minutes on days 1 and 2 of a four-week cycle and Rituximab - 375 mg/m intravenously on day 1 of each cycle up to 6 cycles,followed by maintenance rituxan q8 weeks for 2-3 years. Reviewed side effects and logistics. Hand outs provided to patient for review. Will need port placement. Order entered. Hepatitis negative. Will send EMLA and Zofran. - 11/6/23 vs and CBC reviewed; .52, hgb 12.3, plt 205. Due to start D1/D2 ritux/bendamustane today. Again reviewed side effects. s/p port placement. Sent EMLA cream and Zofran. Proceed with treatment today. Given patient does not live close will arrange for APEX labs to check labs in 1-2 weeks following C1.  - 12/4/23 vs and CBC reviewed; WBC 19.23, hgb 11, plt 167. Patient also had APEX labs done 2 weeks following treatment. Uric acid 7.4, Repeat today. If remains elevated or above 8 will assess allopurinol should be added. Patient has had significant response to WBC following 1 cycle 110-->19. Vital signs 133/63, HR 64, 97F stable and okay to proceed with treatment. Patient did not have any fevers following last treatment but did have some fatigue and foot swelling following last cycle about 2 weeks after treatment. Energy and fatigue significantly improved and she presents back to her baseline today. Advised to continue to elevate legs. Will order b/l US today to rule out DVT. Based on vital signs and labs okay to proceed with treatment today. Will return tomorrow for D2 bendamustane. To also be seen by [facility] in nutrition.  1/4/24 - vs and labs reviewed.  hospitalized with bacteremia. Given lock therapy in [place]. pending repeat blood cultures. If blood cultures negative will proceed with cycle 3 of BR.   # weakness/ debility. - ordered PT  #bacteremia advised to follow up with ID  #Thyroid nodule - s/p US 9/2022 revealing heterogenous thyroid gland. Recommend US guided FNA L sided nodule - s/p bx pathology benign Consistent with a follicular/colloid nodule with cystic degeneration  #Health Maintenance - PCP Dr. Charlton  - Pain Management Dr. Graf pending cortisone injections - Urology Dr. Albrecht  - CNY 2016, given family hx recommend follow up with GI to assess need for another CNY, s/p follow up with GI  - Mammo/Sono needs repeat, PET/CT without evidence of concern  RTC 1 week with CBC with diff, CMP, Mg, Phos, LDH, ESR/CRP

## 2024-01-10 ENCOUNTER — RESULT REVIEW (OUTPATIENT)
Age: 84
End: 2024-01-10

## 2024-01-10 ENCOUNTER — APPOINTMENT (OUTPATIENT)
Dept: HEMATOLOGY ONCOLOGY | Facility: CLINIC | Age: 84
End: 2024-01-10
Payer: MEDICARE

## 2024-01-10 VITALS
DIASTOLIC BLOOD PRESSURE: 64 MMHG | BODY MASS INDEX: 24.31 KG/M2 | TEMPERATURE: 96.8 F | SYSTOLIC BLOOD PRESSURE: 130 MMHG | RESPIRATION RATE: 16 BRPM | OXYGEN SATURATION: 96 % | WEIGHT: 120.56 LBS | HEIGHT: 59 IN | HEART RATE: 68 BPM

## 2024-01-10 PROCEDURE — 36415 COLL VENOUS BLD VENIPUNCTURE: CPT

## 2024-01-10 PROCEDURE — 99214 OFFICE O/P EST MOD 30 MIN: CPT | Mod: 25

## 2024-01-15 NOTE — ASSESSMENT
[With Patient/Caregiver] : With Patient/Caregiver [Designated Health Care Proxy] : Designated Health Care Proxy [Name: ___] : Name: [unfilled] [Relationship: ___] : Relationship: [unfilled] [DNR] : DNR [DNI] : DNI [Last Verification Date: ___] : Last Verification Date: [unfilled] [FreeTextEntry1] : #Leukocytosis 2/2 mantle lymphoma - Peripheral blood, flow cytometric immunophenotyping: Involved by a B-cell lymphoproliferative disorder with partial CD5 expression, kappa light chain-restricted. - The differential diagnosis for further subclassification of this process includes marginal zone lymphoma, lymphoplasmacytic lymphoma, follicular lymphoma, and possibly chronic lymphocytic leukemia/small lymphocytic lymphoma or mantle cell lymphoma. - s/p PET/CT 10/2022 revealing left upper lobe patchy groundglass opacities with mild FDG uptake to SUV 3.0, likely of nonspecific infectious/inflammatory etiology. No additional abnormal foci of FDG uptake to suggest biologic tumor activity. - No B symptoms - If consistent with MCL, patients with low tumor burden, low-risk , disease may have an indolent course, managed by observation. - May consider BR in future if needed. - Currently does not have indication for treatment as there is no anemia, thrombocytopenia, B symptoms, bulky adenopathy. - 5/26/23 vs and CBC reviewed, WBC 66.95, hgb 12.5, plt 236. Reviewed case with Dr. Tran. Reviewed trend of WBC with WBC doubling time of 5 mos. IGVH mutational analysis and FISH CLL panel to be completed today. Reviewed PET/CT. Repeat CT Chest to evaluate ground class opacities. Continues to deny B symptoms. No adenopathy on exam. Weight stable. LDH now slightly elevated at 237. RTC in 6 weeks to monitor blood counts more closely. Repeat flow at next visit. Plan of care discussed with patient and brother Aaron. - 8/10/23 BMBx today. wbc 91k - 9/6/23 - vs and labs reviewed. labs drawn in office today. wbc 103k, hgb 12.3, plts 232. BM bx with MCL 70-80% involvement. 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 MIPI - high risk. Recommend Bendamustine - 90 mg/m intravenously over 30-60 minutes on days 1 and 2 of a four-week cycle and Rituximab - 375 mg/m intravenously on day 1 of each cycle up to 6 cycles,followed by maintenance rituxan q8 weeks for 2-3 years. Check repeat PET CT as counts have been consistently rising. Another option could be Rituximab - 375 mg/m2 once weekly for the first 4 weeks and Lenalidomide - 20 mg daily on days 1 through 21 of every 28-day cycle for 12 cycles. Discussed side effect profile of each. - 10/18/23 vs and CBC reviewed; .83, hgb 13.3, plt 247. Extensive discussion with patient and brother regarding plan of care. Mantle cell lymphoma based on BMBx involving bone marrow. s/p PET/CT 10/2/23 revealing Borderline enlarged spleen. No evidence of FDG avid malignancy. Given high risk score and continued increase to WBC, recommend treatment. Again reviewed both regimens as above. Patient prefers to not take pills daily and also lives far and need transportation from [facility]. She prefers Ritux/ Bendamustane regimen given monthlyl regimen D1/D2. Reviewed Bendamustine - 90 mg/m intravenously over 30-60 minutes on days 1 and 2 of a four-week cycle and Rituximab - 375 mg/m intravenously on day 1 of each cycle up to 6 cycles,followed by maintenance rituxan q8 weeks for 2-3 years. Reviewed side effects and logistics. Hand outs provided to patient for review. Will need port placement. Order entered. Hepatitis negative. Will send EMLA and Zofran. - 11/6/23 vs and CBC reviewed; .52, hgb 12.3, plt 205. Due to start D1/D2 ritux/bendamustane today. Again reviewed side effects. s/p port placement. Sent EMLA cream and Zofran. Proceed with treatment today. Given patient does not live close will arrange for APEX labs to check labs in 1-2 weeks following C1.  - 12/4/23 vs and CBC reviewed; WBC 19.23, hgb 11, plt 167. Patient also had APEX labs done 2 weeks following treatment. Uric acid 7.4, Repeat today. If remains elevated or above 8 will assess allopurinol should be added. Patient has had significant response to WBC following 1 cycle 110-->19. Vital signs 133/63, HR 64, 97F stable and okay to proceed with treatment. Patient did not have any fevers following last treatment but did have some fatigue and foot swelling following last cycle about 2 weeks after treatment. Energy and fatigue significantly improved and she presents back to her baseline today. Advised to continue to elevate legs. Will order b/l US today to rule out DVT. Based on vital signs and labs okay to proceed with treatment today. Will return tomorrow for D2 bendamustane. To also be seen by [facility] in nutrition.  1/4/24 - vs and labs reviewed.  hospitalized with bacteremia. Given lock therapy in [place]. pending repeat blood cultures. If blood cultures negative will proceed with cycle 3 of BR.  1/15/24 - vs and labs reviewed. blood cultures are negative. Ok to proceed with BR cycle 3 - will split dose of rituxan per patient preference. wbc 5.81, hgb 10.8, plts 176.  # weakness/ debility. - ordered PT  #bacteremia - resolved s/p lock therapy  #Thyroid nodule - s/p US 9/2022 revealing heterogenous thyroid gland. Recommend US guided FNA L sided nodule - s/p bx pathology benign Consistent with a follicular/colloid nodule with cystic degeneration  #Health Maintenance - PCP Dr. Charlton  - Pain Management Dr. Graf pending cortisone injections - Urology Dr. Albrecht  - CNY 2016, given family hx recommend follow up with GI to assess need for another CNY, s/p follow up with GI  - Mammo/Sono needs repeat, PET/CT without evidence of concern  RTC 4 weeks with CBC with diff, CMP, Mg, Phos, LDH, ESR/CRP [AdvancecareDate] : 01/10/2024

## 2024-01-15 NOTE — PHYSICAL EXAM
[Ambulatory and capable of all self care but unable to carry out any work activities] : Status 2- Ambulatory and capable of all self care but unable to carry out any work activities. Up and about more than 50% of waking hours [Normal] : affect appropriate [de-identified] : no adenopathy  [de-identified] : no inquinal adenopathy  [de-identified] : no axillary adenopathy

## 2024-01-15 NOTE — HISTORY OF PRESENT ILLNESS
[de-identified] : Ms. Valladares is an 82 year old woman who presents as a hospital follow up.\par  \par  She presented to [place] ED 2022 with generalized fatigue, chills, nausea, poor PO intake, and 20lb weight loss over 1 month\par  \par  Blood work during hospitalization revealed WBC 25-32, hgb 10-1, lymphocytes up to 80%, NIKKI, , K:L 2.04, \par  \par  Flow: Peripheral blood, flow cytometric immunophenotyping: Involved by a B-cell lymphoproliferative disorder with partial CD5 expression, kappa light chain-restricted.  The\par  differential diagnosis for further subclassification of this process includes marginal zone lymphoma, lymphoplasmacytic lymphoma, follicular lymphoma, and possibly chronic lymphocytic leukemia/small lymphocytic lymphoma or mantle cell lymphoma.  In addition, cyclin D1 immunohistochemistry on a bone marrow biopsy or FISH forCCND1/IGH might be considered.  If FISH testing is desired on this specimen, please call the signing pathologist.\par  \par  WBC:  21.9 x 10(9)/L\par  %Lymphs (CBC/automated differential):  75%\par  #Lymphs (CBC/automated differential):  16.5 x 10(9)/L\par  Results:\par  Blasts:  Not increased by CD45/side scatter and CD34.\par  B-cells:  Monotypic kappa\par  \par  Age of Menarche: 13\par  Age of Menopause: 50s\par  OCP/HRT: OCP prior to menopause, no HRT.\par  \par  \par  Health Maintenance:\par  Mammo: 3/2022, q6months\par  GYN:\par  CNY: 2016\par  DEXA: unknown when last done. \par  She has a personal history of lymphoma dx 20 years ago and was managed by PCP.\par  PMH: CAD w/ PCI, HTN, Breast Cancer  s/p lumpectomy/RT/Tamoxifen, ? Mantle Cell Lymphoma.  [de-identified] : Patient presents today for routine follow up. feeling improved after hospitalization ok for cycle 3 of BR

## 2024-01-15 NOTE — REVIEW OF SYSTEMS
[Fatigue] : fatigue [Anxiety] : anxiety [Easy Bruising] : a tendency for easy bruising [Negative] : Allergic/Immunologic [Recent Change In Weight] : ~T recent weight change [Diarrhea: Grade 0] : Diarrhea: Grade 0 [FreeTextEntry2] : lost 3 lbs in 1 week

## 2024-01-25 ENCOUNTER — APPOINTMENT (OUTPATIENT)
Dept: FAMILY MEDICINE | Facility: CLINIC | Age: 84
End: 2024-01-25
Payer: MEDICARE

## 2024-01-25 VITALS
WEIGHT: 120 LBS | DIASTOLIC BLOOD PRESSURE: 50 MMHG | SYSTOLIC BLOOD PRESSURE: 116 MMHG | HEIGHT: 59 IN | TEMPERATURE: 99.7 F | HEART RATE: 94 BPM | BODY MASS INDEX: 24.19 KG/M2 | OXYGEN SATURATION: 99 %

## 2024-01-25 DIAGNOSIS — R39.9 UNSPECIFIED SYMPTOMS AND SIGNS INVOLVING THE GENITOURINARY SYSTEM: ICD-10-CM

## 2024-01-25 DIAGNOSIS — I10 ESSENTIAL (PRIMARY) HYPERTENSION: ICD-10-CM

## 2024-01-25 LAB
BILIRUB UR QL STRIP: NORMAL
CLARITY UR: NORMAL
COLLECTION METHOD: NORMAL
GLUCOSE UR-MCNC: NORMAL
HCG UR QL: 0.2 EU/DL
HGB UR QL STRIP.AUTO: NORMAL
KETONES UR-MCNC: NORMAL
LEUKOCYTE ESTERASE UR QL STRIP: NORMAL
NITRITE UR QL STRIP: NORMAL
PH UR STRIP: 6
PROT UR STRIP-MCNC: 30
SP GR UR STRIP: 1.02

## 2024-01-25 PROCEDURE — 99214 OFFICE O/P EST MOD 30 MIN: CPT

## 2024-01-25 PROCEDURE — 81003 URINALYSIS AUTO W/O SCOPE: CPT | Mod: QW

## 2024-01-25 PROCEDURE — G2211 COMPLEX E/M VISIT ADD ON: CPT

## 2024-01-28 PROBLEM — R39.9 UTI SYMPTOMS: Status: ACTIVE | Noted: 2022-12-08

## 2024-01-28 PROBLEM — I10 ESSENTIAL HYPERTENSION: Status: ACTIVE | Noted: 2022-09-15

## 2024-01-28 LAB
APPEARANCE: ABNORMAL
BACTERIA: ABNORMAL /HPF
BILIRUBIN URINE: NEGATIVE
BLOOD URINE: ABNORMAL
CAST: 1 /LPF
COLOR: YELLOW
EPITHELIAL CELLS: 2 /HPF
GLUCOSE QUALITATIVE U: NEGATIVE MG/DL
KETONES URINE: NEGATIVE MG/DL
LEUKOCYTE ESTERASE URINE: ABNORMAL
MICROSCOPIC-UA: NORMAL
NITRITE URINE: POSITIVE
PH URINE: 6
PROTEIN URINE: 30 MG/DL
RED BLOOD CELLS URINE: 2 /HPF
REVIEW: NORMAL
SPECIFIC GRAVITY URINE: 1.02
UROBILINOGEN URINE: 0.2 MG/DL
WHITE BLOOD CELLS URINE: 672 /HPF

## 2024-01-28 RX ORDER — MULTIVITAMIN
TABLET ORAL DAILY
Refills: 0 | Status: ACTIVE | COMMUNITY
Start: 2023-12-21

## 2024-01-28 NOTE — HISTORY OF PRESENT ILLNESS
[FreeTextEntry8] : 84 yo F with mantle cell lymphoma, HTN, presenting for acute eval for urinary tract infection symptoms. Reports increased frequency and odor of urine, and burning with urination.

## 2024-01-28 NOTE — PLAN
[FreeTextEntry1] : 82 yo F with urinary tract symptoms, will treat clinically with macrobid Follow up with urine culture Additionally, monitor improvement in LE swelling with lasix use PRN 3-4 days.

## 2024-01-29 LAB — BACTERIA UR CULT: ABNORMAL

## 2024-01-30 NOTE — REVIEW OF SYSTEMS
[Fatigue] : fatigue [Recent Change In Weight] : ~T recent weight change [Diarrhea: Grade 0] : Diarrhea: Grade 0 [Anxiety] : anxiety [Easy Bruising] : a tendency for easy bruising [Negative] : Allergic/Immunologic [FreeTextEntry2] : lost 3 lbs in 1 week

## 2024-02-05 ENCOUNTER — RESULT REVIEW (OUTPATIENT)
Age: 84
End: 2024-02-05

## 2024-02-05 ENCOUNTER — APPOINTMENT (OUTPATIENT)
Dept: HEMATOLOGY ONCOLOGY | Facility: CLINIC | Age: 84
End: 2024-02-05
Payer: MEDICARE

## 2024-02-05 VITALS
OXYGEN SATURATION: 98 % | DIASTOLIC BLOOD PRESSURE: 67 MMHG | HEART RATE: 72 BPM | WEIGHT: 120.06 LBS | HEIGHT: 59 IN | RESPIRATION RATE: 18 BRPM | BODY MASS INDEX: 24.2 KG/M2 | TEMPERATURE: 96.8 F | SYSTOLIC BLOOD PRESSURE: 128 MMHG

## 2024-02-05 DIAGNOSIS — M79.89 OTHER SPECIFIED SOFT TISSUE DISORDERS: ICD-10-CM

## 2024-02-05 DIAGNOSIS — R78.81 BACTEREMIA: ICD-10-CM

## 2024-02-05 PROCEDURE — 36415 COLL VENOUS BLD VENIPUNCTURE: CPT

## 2024-02-05 PROCEDURE — 99214 OFFICE O/P EST MOD 30 MIN: CPT | Mod: 25

## 2024-02-06 PROBLEM — R78.81 BACTEREMIA: Status: ACTIVE | Noted: 2024-01-04

## 2024-02-06 PROBLEM — M79.89 LEG SWELLING: Status: ACTIVE | Noted: 2023-12-04

## 2024-02-06 NOTE — HISTORY OF PRESENT ILLNESS
[de-identified] : Ms. 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  The\par  differential diagnosis for further subclassification of this process includes marginal zone lymphoma, lymphoplasmacytic lymphoma, follicular lymphoma, and possibly chronic lymphocytic leukemia/small lymphocytic lymphoma or mantle cell lymphoma.  In addition, cyclin D1 immunohistochemistry on a bone marrow biopsy or FISH forCCND1/IGH might be considered.  If FISH testing is desired on this specimen, please call the signing pathologist.\par  \par  WBC:  21.9 x 10(9)/L\par  %Lymphs (CBC/automated differential):  75%\par  #Lymphs (CBC/automated differential):  16.5 x 10(9)/L\par  Results:\par  Blasts:  Not increased by CD45/side scatter and CD34.\par  B-cells:  Monotypic kappa\par  \par  Age of Menarche: 13\par  Age of Menopause: 50s\par  OCP/HRT: OCP prior to menopause, no HRT.\par  \par  \par  Health Maintenance:\par  Mammo: 3/2022, q6months\par  GYN:\par  CNY: 2016\par  DEXA: unknown when last done. \par  She has a personal history of lymphoma dx 20 years ago and was managed by PCP.\par  PMH: CAD w/ PCI, HTN, Breast Cancer  s/p lumpectomy/RT/Tamoxifen, ? Mantle Cell Lymphoma.  [de-identified] : Patient presents today for routine follow up. She tolerated C3 well. Has some fatigue. Swelling to legs improved s/p lasix given by PCP. Had UTI 1/25/24 s/p completion of Macrobid. No further UTI s/s. She started home PT, doing well, moving better. Denies fevers/chills, HA, dizziness, SOB, cough, CP, palpitations, abd pain, n/v/d, swelling to extremities, back pain, hematuria, BRBPR, abnormal bleeding.

## 2024-02-06 NOTE — ASSESSMENT
[With Patient/Caregiver] : With Patient/Caregiver [Designated Health Care Proxy] : Designated Health Care Proxy [Name: ___] : Name: [unfilled] [Relationship: ___] : Relationship: [unfilled] [DNR] : DNR [DNI] : DNI [Last Verification Date: ___] : Last Verification Date: [unfilled] [FreeTextEntry1] : #Leukocytosis 2/2 mantle lymphoma - Peripheral blood, flow cytometric immunophenotyping: Involved by a B-cell lymphoproliferative disorder with partial CD5 expression, kappa light chain-restricted. - The differential diagnosis for further subclassification of this process includes marginal zone lymphoma, lymphoplasmacytic lymphoma, follicular lymphoma, and possibly chronic lymphocytic leukemia/small lymphocytic lymphoma or mantle cell lymphoma. - s/p PET/CT 10/2022 revealing left upper lobe patchy groundglass opacities with mild FDG uptake to SUV 3.0, likely of nonspecific infectious/inflammatory etiology. No additional abnormal foci of FDG uptake to suggest biologic tumor activity. - No B symptoms - If consistent with MCL, patients with low tumor burden, low-risk , disease may have an indolent course, managed by observation. - May consider BR in future if needed. - Currently does not have indication for treatment as there is no anemia, thrombocytopenia, B symptoms, bulky adenopathy. - 5/26/23 vs and CBC reviewed, WBC 66.95, hgb 12.5, plt 236. Reviewed case with Dr. Tran. Reviewed trend of WBC with WBC doubling time of 5 mos. [facility] mutational analysis and FISH CLL panel to be completed today. Reviewed PET/CT. Repeat CT Chest to evaluate ground class opacities. Continues to deny B symptoms. No adenopathy on exam. Weight stable. LDH now slightly elevated at 237. RTC in 6 weeks to monitor blood counts more closely. Repeat flow at next visit. Plan of care discussed with patient and brother Aaron. - 8/10/23 BMBx today. wbc 91k - 9/6/23 - vs and labs reviewed. labs drawn in office today. wbc 103k, hgb 12.3, plts 232. BM bx with MCL 70-80% involvement. 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 MIPI - high risk. Recommend Bendamustine - 90 mg/m intravenously over 30-60 minutes on days 1 and 2 of a four-week cycle and Rituximab - 375 mg/m intravenously on day 1 of each cycle up to 6 cycles,followed by maintenance rituxan q8 weeks for 2-3 years. Check repeat PET CT as counts have been consistently rising. Another option could be Rituximab - 375 mg/m2 once weekly for the first 4 weeks and Lenalidomide - 20 mg daily on days 1 through 21 of every 28-day cycle for 12 cycles. Discussed side effect profile of each. - 10/18/23 vs and CBC reviewed; .83, hgb 13.3, plt 247. Extensive discussion with patient and brother regarding plan of care. Mantle cell lymphoma based on BMBx involving bone marrow. s/p PET/CT 10/2/23 revealing Borderline enlarged spleen. No evidence of FDG avid malignancy. Given high risk score and continued increase to WBC, recommend treatment. Again reviewed both regimens as above. Patient prefers to not take pills daily and also lives far and need transportation from [facility]. She prefers Ritux/ Bendamustane regimen given monthlyl regimen D1/D2. Reviewed Bendamustine - 90 mg/m intravenously over 30-60 minutes on days 1 and 2 of a four-week cycle and Rituximab - 375 mg/m intravenously on day 1 of each cycle up to 6 cycles,followed by maintenance rituxan q8 weeks for 2-3 years. Reviewed side effects and logistics. Hand outs provided to patient for review. Will need port placement. Order entered. Hepatitis negative. Will send EMLA and Zofran. - 11/6/23 vs and CBC reviewed; .52, hgb 12.3, plt 205. Due to start D1/D2 ritux/bendamustane today. Again reviewed side effects. s/p port placement. Sent EMLA cream and Zofran. Proceed with treatment today. Given patient does not live close will arrange for APEX labs to check labs in 1-2 weeks following C1.  - 12/4/23 vs and CBC reviewed; WBC 19.23, hgb 11, plt 167. Patient also had APEX labs done 2 weeks following treatment. Uric acid 7.4, Repeat today. If remains elevated or above 8 will assess allopurinol should be added. Patient has had significant response to WBC following 1 cycle 110-->19. Vital signs 133/63, HR 64, 97F stable and okay to proceed with treatment. Patient did not have any fevers following last treatment but did have some fatigue and foot swelling following last cycle about 2 weeks after treatment. Energy and fatigue significantly improved and she presents back to her baseline today. Advised to continue to elevate legs. Will order b/l US today to rule out DVT. Based on vital signs and labs okay to proceed with treatment today. Will return tomorrow for D2 bendamustane. To also be seen by [facility] in nutrition.  - 1/4/24 - vs and labs reviewed.  hospitalized with bacteremia. Given lock therapy in PORT. pending repeat blood cultures. If blood cultures negative will proceed with cycle 3 of BR.  - 1/15/24 - vs and labs reviewed. blood cultures are negative. Ok to proceed with BR cycle 3 - will split dose of rituxan per patient preference. wbc 5.81, hgb 10.8, plts 176. - 2/5/24 vs and CBC reviewed; WBC 5.97, hgb 10.8, plt 174, ANC 4.94. s/p C4 BR. Proceed with C4 today. Remains on split dose due to patient preference. 2 days early for tx this mos as Mon/Tues work better for her. Reviewed with pharmacy and okay to proceed. No s/s infection. s/p UTI 1/25/24 s/p completion of Macrobid. Port to be Deaccessed following D1 treatment and reaccessed D2.   #Weakness/Debility - s/p hospitalization for bacteremia  - Ordered PT - s/p start of home PT, moving better   #Leg Swelling  - Started on lasix by PCP  - Significantly improved   #Bacteremia  - Resolved s/p lock therapy - Port to be deaccessed following D1 treatmement and reaccessed D2.   #Thyroid nodule - s/p US 9/2022 revealing heterogenous thyroid gland. Recommend US guided FNA L sided nodule - s/p bx pathology benign Consistent with a follicular/colloid nodule with cystic degeneration  #Health Maintenance - PCP Dr. Charlton  - Pain Management Dr. Graf pending cortisone injections - Urology Dr. Albrecht  - CNY 2016, given family hx recommend follow up with GI to assess need for another CNY, s/p follow up with GI  - Mammo/Sono needs repeat, PET/CT without evidence of concern  RTC 4 weeks with CBC with diff, CMP, Mg, Phos, LDH, ESR/CRP  Case and management discussed with Dr. Sears  [AdvancecareDate] : 01/10/2024

## 2024-02-06 NOTE — PHYSICAL EXAM
[Ambulatory and capable of all self care but unable to carry out any work activities] : Status 2- Ambulatory and capable of all self care but unable to carry out any work activities. Up and about more than 50% of waking hours [Normal] : affect appropriate [de-identified] : no adenopathy  [de-identified] : no axillary adenopathy  [de-identified] : no inquinal adenopathy

## 2024-02-26 NOTE — REVIEW OF SYSTEMS
[Fatigue] : fatigue [Diarrhea: Grade 0] : Diarrhea: Grade 0 [Anxiety] : anxiety [Easy Bruising] : a tendency for easy bruising [Negative] : Allergic/Immunologic

## 2024-03-04 ENCOUNTER — APPOINTMENT (OUTPATIENT)
Dept: HEMATOLOGY ONCOLOGY | Facility: CLINIC | Age: 84
End: 2024-03-04
Payer: MEDICARE

## 2024-03-04 ENCOUNTER — RESULT REVIEW (OUTPATIENT)
Age: 84
End: 2024-03-04

## 2024-03-04 VITALS
SYSTOLIC BLOOD PRESSURE: 107 MMHG | TEMPERATURE: 96.9 F | OXYGEN SATURATION: 98 % | DIASTOLIC BLOOD PRESSURE: 58 MMHG | HEIGHT: 59 IN | RESPIRATION RATE: 17 BRPM | WEIGHT: 116.56 LBS | BODY MASS INDEX: 23.5 KG/M2 | HEART RATE: 72 BPM

## 2024-03-04 PROCEDURE — 36415 COLL VENOUS BLD VENIPUNCTURE: CPT

## 2024-03-04 PROCEDURE — 99214 OFFICE O/P EST MOD 30 MIN: CPT | Mod: 25

## 2024-04-01 ENCOUNTER — RESULT REVIEW (OUTPATIENT)
Age: 84
End: 2024-04-01

## 2024-04-01 ENCOUNTER — APPOINTMENT (OUTPATIENT)
Dept: HEMATOLOGY ONCOLOGY | Facility: CLINIC | Age: 84
End: 2024-04-01
Payer: MEDICARE

## 2024-04-01 VITALS
OXYGEN SATURATION: 98 % | TEMPERATURE: 96.4 F | SYSTOLIC BLOOD PRESSURE: 124 MMHG | DIASTOLIC BLOOD PRESSURE: 63 MMHG | BODY MASS INDEX: 23.41 KG/M2 | RESPIRATION RATE: 16 BRPM | HEART RATE: 64 BPM | HEIGHT: 59 IN | WEIGHT: 116.13 LBS

## 2024-04-01 DIAGNOSIS — R53.1 WEAKNESS: ICD-10-CM

## 2024-04-01 DIAGNOSIS — D72.820 LYMPHOCYTOSIS (SYMPTOMATIC): ICD-10-CM

## 2024-04-01 PROCEDURE — 99215 OFFICE O/P EST HI 40 MIN: CPT

## 2024-04-01 PROCEDURE — 36415 COLL VENOUS BLD VENIPUNCTURE: CPT

## 2024-04-01 RX ORDER — ONDANSETRON 4 MG/1
4 TABLET, ORALLY DISINTEGRATING ORAL
Qty: 20 | Refills: 1 | Status: COMPLETED | COMMUNITY
Start: 2023-11-06 | End: 2024-04-01

## 2024-04-01 RX ORDER — FUROSEMIDE 20 MG/1
20 TABLET ORAL DAILY
Qty: 15 | Refills: 0 | Status: COMPLETED | COMMUNITY
Start: 2024-01-25 | End: 2024-04-01

## 2024-04-01 RX ORDER — LIDOCAINE AND PRILOCAINE 25; 25 MG/G; MG/G
2.5-2.5 CREAM TOPICAL
Qty: 1 | Refills: 0 | Status: COMPLETED | COMMUNITY
Start: 2023-11-05 | End: 2024-04-01

## 2024-04-01 RX ORDER — NITROFURANTOIN (MONOHYDRATE/MACROCRYSTALS) 25; 75 MG/1; MG/1
100 CAPSULE ORAL
Qty: 10 | Refills: 0 | Status: COMPLETED | COMMUNITY
Start: 2024-01-25 | End: 2024-04-01

## 2024-04-01 NOTE — ASSESSMENT
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 - The differential diagnosis for further subclassification of this process includes marginal zone lymphoma, lymphoplasmacytic lymphoma, follicular lymphoma, and possibly chronic lymphocytic leukemia/small lymphocytic lymphoma or mantle cell lymphoma. - s/p PET/CT 10/2022 revealing left upper lobe patchy groundglass opacities with mild FDG uptake to SUV 3.0, likely of nonspecific infectious/inflammatory etiology. No additional abnormal foci of FDG uptake to suggest biologic tumor activity. - No B symptoms - If consistent with MCL, patients with low tumor burden, low-risk , disease may have an indolent course, managed by observation. - May consider BR in future if needed. - Currently does not have indication for treatment as there is no anemia, thrombocytopenia, B symptoms, bulky adenopathy. - 5/26/23 vs and CBC reviewed, WBC 66.95, hgb 12.5, plt 236. Reviewed case with Dr. Tran. Reviewed trend of WBC with WBC doubling time of 5 mos. IGVH mutational analysis and FISH CLL panel to be completed today. Reviewed PET/CT. Repeat CT Chest to evaluate ground class opacities. Continues to deny B symptoms. No adenopathy on exam. Weight stable. LDH now slightly elevated at 237. RTC in 6 weeks to monitor blood counts more closely. Repeat flow at next visit. Plan of care discussed with patient and brother Aaron. - 8/10/23 BMBx today. wbc 91k - 9/6/23 - vs and labs reviewed. labs drawn in office today. wbc 103k, hgb 12.3, plts 232. BM bx with MCL 70-80% involvement. 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 MIPI - high risk. Recommend Bendamustine - 90 mg/m intravenously over 30-60 minutes on days 1 and 2 of a four-week cycle and Rituximab - 375 mg/m intravenously on day 1 of each cycle up to 6 cycles,followed by maintenance rituxan q8 weeks for 2-3 years. Check repeat PET CT as counts have been consistently rising. Another option could be Rituximab - 375 mg/m2 once weekly for the first 4 weeks and Lenalidomide - 20 mg daily on days 1 through 21 of every 28-day cycle for 12 cycles. Discussed side effect profile of each. - 10/18/23 vs and CBC reviewed; .83, hgb 13.3, plt 247. Extensive discussion with patient and brother regarding plan of care. Mantle cell lymphoma based on BMBx involving bone marrow. s/p PET/CT 10/2/23 revealing Borderline enlarged spleen. No evidence of FDG avid malignancy. Given high risk score and continued increase to WBC, recommend treatment. Again reviewed both regimens as above. Patient prefers to not take pills daily and also lives far and need transportation from [facility]. She prefers Ritux/ Bendamustane regimen given monthlyl regimen D1/D2. Reviewed Bendamustine - 90 mg/m intravenously over 30-60 minutes on days 1 and 2 of a four-week cycle and Rituximab - 375 mg/m intravenously on day 1 of each cycle up to 6 cycles,followed by maintenance rituxan q8 weeks for 2-3 years. Reviewed side effects and logistics. Hand outs provided to patient for review. Will need port placement. Order entered. Hepatitis negative. Will send EMLA and Zofran. - 11/6/23 vs and CBC reviewed; .52, hgb 12.3, plt 205. Due to start D1/D2 ritux/bendamustane today. Again reviewed side effects. s/p port placement. Sent EMLA cream and Zofran. Proceed with treatment today. Given patient does not live close will arrange for APEX labs to check labs in 1-2 weeks following C1.  - 12/4/23 vs and CBC reviewed; WBC 19.23, hgb 11, plt 167. Patient also had APEX labs done 2 weeks following treatment. Uric acid 7.4, Repeat today. If remains elevated or above 8 will assess allopurinol should be added. Patient has had significant response to WBC following 1 cycle 110-->19. Vital signs 133/63, HR 64, 97F stable and okay to proceed with treatment. Patient did not have any fevers following last treatment but did have some fatigue and foot swelling following last cycle about 2 weeks after treatment. Energy and fatigue significantly improved and she presents back to her baseline today. Advised to continue to elevate legs. Will order b/l US today to rule out DVT. Based on vital signs and labs okay to proceed with treatment today. Will return tomorrow for D2 bendamustane. To also be seen by [facility] in nutrition.  - 1/4/24 - vs and labs reviewed.  hospitalized with bacteremia. Given lock therapy in [facility]. pending repeat blood cultures. If blood cultures negative will proceed with cycle 3 of BR.  - 1/15/24 - vs and labs reviewed. blood cultures are negative. Ok to proceed with BR cycle 3 - will split dose of rituxan per patient preference. wbc 5.81, hgb 10.8, plts 176. - 2/5/24 vs and CBC reviewed; WBC 5.97, hgb 10.8, plt 174, ANC 4.94. s/p C4 BR. Proceed with C4 today. Remains on split dose due to patient preference. 2 days early for tx this mos as Mon/Tues work better for her. Reviewed with pharmacy and okay to proceed. No s/s infection. s/p UTI 1/25/24 s/p completion of Macrobid. Port to be Deaccessed following D1 treatment and reaccessed D2.  - 3/4/24 vs and CBC reviewed; WBC 4.07, hgb 10.7, plt 151 ANC 2.88. Last few cycles she was receiving split dose. Discussed with Dr. Sears since tolerating will give full dose D1 today. Reviewed with patient and son. Agreed. Proceed with C5 today 4/1/24 - vs and labs reviewed. labs drawn in office today. wbc 3.99, plts 125, hgb 10.8. monitor. proceed with cycle 6 of BR. She does not want a BM biopsy to assess response. Her lymphocyte counts have dropped dramatically thus likely had a response. PET CT did not reveal anything prior. Will continue rituxan q 2 months for 2-3 years for maintenance  #Weakness/Debility - s/p hospitalization for bacteremia  - Ordered PT - s/p start of home PT, moving better   #Leg Swelling  - Started on lasix by PCP  - Significantly improved   #Thyroid nodule - s/p US 9/2022 revealing heterogenous thyroid gland. Recommend US guided FNA L sided nodule - s/p bx pathology benign Consistent with a follicular/colloid nodule with cystic degeneration  #Health Maintenance - PCP Dr. Charlton  - Pain Management Dr. Graf pending cortisone injections - Urology Dr. Albrecht  - CNY 2016, given family hx recommend follow up with GI to assess need for another CNY, s/p follow up with GI  - Mammo/Sono needs repeat, PET/CT without evidence of concern  RTC 8 weeks with CBC with diff, CMP, Mg, Phos, LDH, ESR/CRP [AdvancecareDate] : 01/10/2024

## 2024-04-01 NOTE — REVIEW OF SYSTEMS
[Diarrhea: Grade 0] : Diarrhea: Grade 0 [Anxiety] : anxiety [Easy Bruising] : a tendency for easy bruising [Negative] : Allergic/Immunologic [Fatigue] : fatigue

## 2024-04-01 NOTE — HISTORY OF PRESENT ILLNESS
[de-identified] : Ms. 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  The\par  differential diagnosis for further subclassification of this process includes marginal zone lymphoma, lymphoplasmacytic lymphoma, follicular lymphoma, and possibly chronic lymphocytic leukemia/small lymphocytic lymphoma or mantle cell lymphoma.  In addition, cyclin D1 immunohistochemistry on a bone marrow biopsy or FISH forCCND1/IGH might be considered.  If FISH testing is desired on this specimen, please call the signing pathologist.\par  \par  WBC:  21.9 x 10(9)/L\par  %Lymphs (CBC/automated differential):  75%\par  #Lymphs (CBC/automated differential):  16.5 x 10(9)/L\par  Results:\par  Blasts:  Not increased by CD45/side scatter and CD34.\par  B-cells:  Monotypic kappa\par  \par  Age of Menarche: 13\par  Age of Menopause: 50s\par  OCP/HRT: OCP prior to menopause, no HRT.\par  \par  \par  Health Maintenance:\par  Mammo: 3/2022, q6months\par  GYN:\par  CNY: 2016\par  DEXA: unknown when last done. \par  She has a personal history of lymphoma dx 20 years ago and was managed by PCP.\par  PMH: CAD w/ PCI, HTN, Breast Cancer  s/p lumpectomy/RT/Tamoxifen, ? Mantle Cell Lymphoma.  [de-identified] : Patient presents today for routine follow up.  Tolerated last cycle well. Has some fatigue, doing PT.  Denies fevers/chills, HA, dizziness, SOB, cough, CP, palpitations, abd pain, n/v/d, swelling to extremities, back pain, hematuria, BRBPR, abnormal bleeding.  Pt is here for f/u Overall feels well

## 2024-04-01 NOTE — ASSESSMENT
[With Patient/Caregiver] : With Patient/Caregiver [Designated Health Care Proxy] : Designated Health Care Proxy [Name: ___] : Name: [unfilled] [Relationship: ___] : Relationship: [unfilled] [DNR] : DNR [DNI] : DNI [Last Verification Date: ___] : Last Verification Date: [unfilled] [FreeTextEntry1] : #Leukocytosis 2/2 mantle lymphoma - Peripheral blood, flow cytometric immunophenotyping: Involved by a B-cell lymphoproliferative disorder with partial CD5 expression, kappa light chain-restricted. - The differential diagnosis for further subclassification of this process includes marginal zone lymphoma, lymphoplasmacytic lymphoma, follicular lymphoma, and possibly chronic lymphocytic leukemia/small lymphocytic lymphoma or mantle cell lymphoma. - s/p PET/CT 10/2022 revealing left upper lobe patchy groundglass opacities with mild FDG uptake to SUV 3.0, likely of nonspecific infectious/inflammatory etiology. No additional abnormal foci of FDG uptake to suggest biologic tumor activity. - No B symptoms - If consistent with MCL, patients with low tumor burden, low-risk , disease may have an indolent course, managed by observation. - May consider BR in future if needed. - Currently does not have indication for treatment as there is no anemia, thrombocytopenia, B symptoms, bulky adenopathy. - 5/26/23 vs and CBC reviewed, WBC 66.95, hgb 12.5, plt 236. Reviewed case with Dr. Tran. Reviewed trend of WBC with WBC doubling time of 5 mos. IGVH mutational analysis and FISH CLL panel to be completed today. Reviewed PET/CT. Repeat CT Chest to evaluate ground class opacities. Continues to deny B symptoms. No adenopathy on exam. Weight stable. LDH now slightly elevated at 237. RTC in 6 weeks to monitor blood counts more closely. Repeat flow at next visit. Plan of care discussed with patient and brother Aaron. - 8/10/23 BMBx today. wbc 91k - 9/6/23 - vs and labs reviewed. labs drawn in office today. wbc 103k, hgb 12.3, plts 232. BM bx with MCL 70-80% involvement. 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 MIPI - high risk. Recommend Bendamustine - 90 mg/m intravenously over 30-60 minutes on days 1 and 2 of a four-week cycle and Rituximab - 375 mg/m intravenously on day 1 of each cycle up to 6 cycles,followed by maintenance rituxan q8 weeks for 2-3 years. Check repeat PET CT as counts have been consistently rising. Another option could be Rituximab - 375 mg/m2 once weekly for the first 4 weeks and Lenalidomide - 20 mg daily on days 1 through 21 of every 28-day cycle for 12 cycles. Discussed side effect profile of each. - 10/18/23 vs and CBC reviewed; .83, hgb 13.3, plt 247. Extensive discussion with patient and brother regarding plan of care. Mantle cell lymphoma based on BMBx involving bone marrow. s/p PET/CT 10/2/23 revealing Borderline enlarged spleen. No evidence of FDG avid malignancy. Given high risk score and continued increase to WBC, recommend treatment. Again reviewed both regimens as above. Patient prefers to not take pills daily and also lives far and need transportation from [facility]. She prefers Ritux/ Bendamustane regimen given monthlyl regimen D1/D2. Reviewed Bendamustine - 90 mg/m intravenously over 30-60 minutes on days 1 and 2 of a four-week cycle and Rituximab - 375 mg/m intravenously on day 1 of each cycle up to 6 cycles,followed by maintenance rituxan q8 weeks for 2-3 years. Reviewed side effects and logistics. Hand outs provided to patient for review. Will need port placement. Order entered. Hepatitis negative. Will send EMLA and Zofran. - 11/6/23 vs and CBC reviewed; .52, hgb 12.3, plt 205. Due to start D1/D2 ritux/bendamustane today. Again reviewed side effects. s/p port placement. Sent EMLA cream and Zofran. Proceed with treatment today. Given patient does not live close will arrange for APEX labs to check labs in 1-2 weeks following C1.  - 12/4/23 vs and CBC reviewed; WBC 19.23, hgb 11, plt 167. Patient also had APEX labs done 2 weeks following treatment. Uric acid 7.4, Repeat today. If remains elevated or above 8 will assess allopurinol should be added. Patient has had significant response to WBC following 1 cycle 110-->19. Vital signs 133/63, HR 64, 97F stable and okay to proceed with treatment. Patient did not have any fevers following last treatment but did have some fatigue and foot swelling following last cycle about 2 weeks after treatment. Energy and fatigue significantly improved and she presents back to her baseline today. Advised to continue to elevate legs. Will order b/l US today to rule out DVT. Based on vital signs and labs okay to proceed with treatment today. Will return tomorrow for D2 bendamustane. To also be seen by [facility] in nutrition.  - 1/4/24 - vs and labs reviewed.  hospitalized with bacteremia. Given lock therapy in PORT. pending repeat blood cultures. If blood cultures negative will proceed with cycle 3 of BR.  - 1/15/24 - vs and labs reviewed. blood cultures are negative. Ok to proceed with BR cycle 3 - will split dose of rituxan per patient preference. wbc 5.81, hgb 10.8, plts 176. - 2/5/24 vs and CBC reviewed; WBC 5.97, hgb 10.8, plt 174, ANC 4.94. s/p C4 BR. Proceed with C4 today. Remains on split dose due to patient preference. 2 days early for tx this mos as Mon/Tues work better for her. Reviewed with pharmacy and okay to proceed. No s/s infection. s/p UTI 1/25/24 s/p completion of Macrobid. Port to be Deaccessed following D1 treatment and reaccessed D2.  - 3/4/24 vs and CBC reviewed; WBC 4.07, hgb 10.7, plt 151 ANC 2.88. Last few cycles she was recievng split dose. Discussed with Dr. Sears since tolerating will give full dose D1 today. Reviewed with patient and son. Agreed. Proceed with C5 today  #Weakness/Debility - s/p hospitalization for bacteremia  - Ordered PT - s/p start of home PT, moving better   #Leg Swelling  - Started on lasix by PCP  - Significantly improved   #Bacteremia  - Resolved s/p lock therapy - Port to be deaccessed following D1 treatmement and reaccessed D2.   #Thyroid nodule - s/p US 9/2022 revealing heterogenous thyroid gland. Recommend US guided FNA L sided nodule - s/p bx pathology benign Consistent with a follicular/colloid nodule with cystic degeneration  #Health Maintenance - PCP Dr. Charlton  - Pain Management Dr. Graf pending cortisone injections - Urology Dr. Albrecht  - CNY 2016, given family hx recommend follow up with GI to assess need for another CNY, s/p follow up with GI  - Mammo/Sono needs repeat, PET/CT without evidence of concern  RTC 4 weeks with CBC with diff, CMP, Mg, Phos, LDH, ESR/CRP  Case and management discussed with Dr. Sears  [AdvancecareDate] : 01/10/2024

## 2024-04-01 NOTE — HISTORY OF PRESENT ILLNESS
[de-identified] : Ms. Valladares is an 82 year old woman who presents as a hospital follow up.\par  \par  She presented to [place] ED 2022 with generalized fatigue, chills, nausea, poor PO intake, and 20lb weight loss over 1 month\par  \par  Blood work during hospitalization revealed WBC 25-32, hgb 10-1, lymphocytes up to 80%, NIKKI, , K:L 2.04, \par  \par  Flow: Peripheral blood, flow cytometric immunophenotyping: Involved by a B-cell lymphoproliferative disorder with partial CD5 expression, kappa light chain-restricted.  The\par  differential diagnosis for further subclassification of this process includes marginal zone lymphoma, lymphoplasmacytic lymphoma, follicular lymphoma, and possibly chronic lymphocytic leukemia/small lymphocytic lymphoma or mantle cell lymphoma.  In addition, cyclin D1 immunohistochemistry on a bone marrow biopsy or FISH forCCND1/IGH might be considered.  If FISH testing is desired on this specimen, please call the signing pathologist.\par  \par  WBC:  21.9 x 10(9)/L\par  %Lymphs (CBC/automated differential):  75%\par  #Lymphs (CBC/automated differential):  16.5 x 10(9)/L\par  Results:\par  Blasts:  Not increased by CD45/side scatter and CD34.\par  B-cells:  Monotypic kappa\par  \par  Age of Menarche: 13\par  Age of Menopause: 50s\par  OCP/HRT: OCP prior to menopause, no HRT.\par  \par  \par  Health Maintenance:\par  Mammo: 3/2022, q6months\par  GYN:\par  CNY: 2016\par  DEXA: unknown when last done. \par  She has a personal history of lymphoma dx 20 years ago and was managed by PCP.\par  PMH: CAD w/ PCI, HTN, Breast Cancer  s/p lumpectomy/RT/Tamoxifen, ? Mantle Cell Lymphoma.  [de-identified] : Patient presents today for routine follow up. Tolerated last cycle well. Has some fatigue, doing PT. Swelling to legs improved s/p lasix given by PCP. Does urinate frequently. Lost 4lbs but states appetite is good. Denies fevers/chills, HA, dizziness, SOB, cough, CP, palpitations, abd pain, n/v/d, swelling to extremities, back pain, hematuria, BRBPR, abnormal bleeding.

## 2024-04-01 NOTE — PHYSICAL EXAM
[Ambulatory and capable of all self care but unable to carry out any work activities] : Status 2- Ambulatory and capable of all self care but unable to carry out any work activities. Up and about more than 50% of waking hours [Normal] : affect appropriate [de-identified] : no adenopathy  [de-identified] : no axillary adenopathy  [de-identified] : no inquinal adenopathy

## 2024-04-01 NOTE — PHYSICAL EXAM
[Ambulatory and capable of all self care but unable to carry out any work activities] : Status 2- Ambulatory and capable of all self care but unable to carry out any work activities. Up and about more than 50% of waking hours [Normal] : affect appropriate [de-identified] : no adenopathy  [de-identified] : no axillary adenopathy  [de-identified] : no inquinal adenopathy

## 2024-04-02 ENCOUNTER — NON-APPOINTMENT (OUTPATIENT)
Age: 84
End: 2024-04-02

## 2024-04-12 LAB
ERYTHROCYTE [SEDIMENTATION RATE] IN BLOOD BY WESTERGREN METHOD: 8 MM/HR
T3 SERPL-MCNC: 80 NG/DL
T4 FREE SERPL-MCNC: 1.6 NG/DL
THYROGLOB AB SERPL IA-ACNC: <1.8 IU/ML
THYROGLOB AB SERPL-ACNC: <20 IU/ML
THYROGLOB SERPL-MCNC: 6.43 NG/ML
TSH RECEPTOR AB: <1.1 IU/L
TSH SERPL-ACNC: 0.02 UIU/ML
TSI ACT/NOR SER: <0.1 IU/L

## 2024-06-03 ENCOUNTER — APPOINTMENT (OUTPATIENT)
Dept: HEMATOLOGY ONCOLOGY | Facility: CLINIC | Age: 84
End: 2024-06-03
Payer: MEDICARE

## 2024-06-03 ENCOUNTER — RESULT REVIEW (OUTPATIENT)
Age: 84
End: 2024-06-03

## 2024-06-03 VITALS
OXYGEN SATURATION: 98 % | DIASTOLIC BLOOD PRESSURE: 72 MMHG | TEMPERATURE: 97 F | WEIGHT: 114.5 LBS | SYSTOLIC BLOOD PRESSURE: 113 MMHG | HEART RATE: 72 BPM | BODY MASS INDEX: 23.08 KG/M2 | RESPIRATION RATE: 16 BRPM | HEIGHT: 59 IN

## 2024-06-03 DIAGNOSIS — C83.10 MANTLE CELL LYMPHOMA, UNSPECIFIED SITE: ICD-10-CM

## 2024-06-03 PROCEDURE — 36415 COLL VENOUS BLD VENIPUNCTURE: CPT

## 2024-06-03 PROCEDURE — 99214 OFFICE O/P EST MOD 30 MIN: CPT | Mod: 25

## 2024-06-04 ENCOUNTER — NON-APPOINTMENT (OUTPATIENT)
Age: 84
End: 2024-06-04

## 2024-06-09 PROBLEM — C83.10 MANTLE CELL LYMPHOMA: Status: ACTIVE | Noted: 2022-09-22

## 2024-06-09 NOTE — REVIEW OF SYSTEMS
[Fatigue] : fatigue [Diarrhea: Grade 0] : Diarrhea: Grade 0 [Anxiety] : anxiety [Easy Bruising] : a tendency for easy bruising [Negative] : Allergic/Immunologic [Abdominal Pain] : no abdominal pain [Vomiting] : no vomiting [Constipation] : no constipation [FreeTextEntry8] : urinary frequency at baseline

## 2024-06-09 NOTE — PHYSICAL EXAM
[Ambulatory and capable of all self care but unable to carry out any work activities] : Status 2- Ambulatory and capable of all self care but unable to carry out any work activities. Up and about more than 50% of waking hours [Normal] : affect appropriate [de-identified] : no adenopathy  [de-identified] : no axillary adenopathy  [de-identified] : no inquinal adenopathy

## 2024-06-09 NOTE — HISTORY OF PRESENT ILLNESS
[de-identified] : Ms. Valladares is an 82 year old woman who presents as a hospital follow up.\par  \par  She presented to [place] ED 2022 with generalized fatigue, chills, nausea, poor PO intake, and 20lb weight loss over 1 month\par  \par  Blood work during hospitalization revealed WBC 25-32, hgb 10-1, lymphocytes up to 80%, NIKKI, , K:L 2.04, \par  \par  Flow: Peripheral blood, flow cytometric immunophenotyping: Involved by a B-cell lymphoproliferative disorder with partial CD5 expression, kappa light chain-restricted.  The\par  differential diagnosis for further subclassification of this process includes marginal zone lymphoma, lymphoplasmacytic lymphoma, follicular lymphoma, and possibly chronic lymphocytic leukemia/small lymphocytic lymphoma or mantle cell lymphoma.  In addition, cyclin D1 immunohistochemistry on a bone marrow biopsy or FISH forCCND1/IGH might be considered.  If FISH testing is desired on this specimen, please call the signing pathologist.\par  \par  WBC:  21.9 x 10(9)/L\par  %Lymphs (CBC/automated differential):  75%\par  #Lymphs (CBC/automated differential):  16.5 x 10(9)/L\par  Results:\par  Blasts:  Not increased by CD45/side scatter and CD34.\par  B-cells:  Monotypic kappa\par  \par  Age of Menarche: 13\par  Age of Menopause: 50s\par  OCP/HRT: OCP prior to menopause, no HRT.\par  \par  \par  Health Maintenance:\par  Mammo: 3/2022, q6months\par  GYN:\par  CNY: 2016\par  DEXA: unknown when last done. \par  She has a personal history of lymphoma dx 20 years ago and was managed by PCP.\par  PMH: CAD w/ PCI, HTN, Breast Cancer  s/p lumpectomy/RT/Tamoxifen, ? Mantle Cell Lymphoma.  [de-identified] : Patient presents today for routine follow up. She is now on maintenance rituxan q2 mos. Does not want BMbx. Feels well. She did have a tooth extracted and was placed on amoxicillin and now completed. She also was going for lumbar cortisone shot. She did have a fall at the eye doctor a couple of weeks ago. Her eyes were dilated and then she went to sit on rolling chair and fell to bottom. She was sent to [facility] CTs were done and per patient and son all negative. Denies fevers/chills, HA, dizziness, SOB, cough, CP, palpitations, abd pain, n/v/d, swelling to extremities, back pain, hematuria, BRBPR, abnormal bleeding.

## 2024-06-09 NOTE — ASSESSMENT
[With Patient/Caregiver] : With Patient/Caregiver [Designated Health Care Proxy] : Designated Health Care Proxy [Name: ___] : Name: [unfilled] [Relationship: ___] : Relationship: [unfilled] [DNR] : DNR [DNI] : DNI [Last Verification Date: ___] : Last Verification Date: [unfilled] [FreeTextEntry1] : #Leukocytosis 2/2 mantle lymphoma - Peripheral blood, flow cytometric immunophenotyping: Involved by a B-cell lymphoproliferative disorder with partial CD5 expression, kappa light chain-restricted. - The differential diagnosis for further subclassification of this process includes marginal zone lymphoma, lymphoplasmacytic lymphoma, follicular lymphoma, and possibly chronic lymphocytic leukemia/small lymphocytic lymphoma or mantle cell lymphoma. - s/p PET/CT 10/2022 revealing left upper lobe patchy groundglass opacities with mild FDG uptake to SUV 3.0, likely of nonspecific infectious/inflammatory etiology. No additional abnormal foci of FDG uptake to suggest biologic tumor activity. - No B symptoms - If consistent with MCL, patients with low tumor burden, low-risk , disease may have an indolent course, managed by observation. - May consider BR in future if needed. - Currently does not have indication for treatment as there is no anemia, thrombocytopenia, B symptoms, bulky adenopathy. - 5/26/23 vs and CBC reviewed, WBC 66.95, hgb 12.5, plt 236. Reviewed case with Dr. Tran. Reviewed trend of WBC with WBC doubling time of 5 mos. IGVH mutational analysis and FISH CLL panel to be completed today. Reviewed PET/CT. Repeat CT Chest to evaluate ground class opacities. Continues to deny B symptoms. No adenopathy on exam. Weight stable. LDH now slightly elevated at 237. RTC in 6 weeks to monitor blood counts more closely. Repeat flow at next visit. Plan of care discussed with patient and brother Aaron. - 8/10/23 BMBx today. wbc 91k - 9/6/23 - vs and labs reviewed. labs drawn in office today. wbc 103k, hgb 12.3, plts 232. BM bx with MCL 70-80% involvement. 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 MIPI - high risk. Recommend Bendamustine - 90 mg/m intravenously over 30-60 minutes on days 1 and 2 of a four-week cycle and Rituximab - 375 mg/m intravenously on day 1 of each cycle up to 6 cycles,followed by maintenance rituxan q8 weeks for 2-3 years. Check repeat PET CT as counts have been consistently rising. Another option could be Rituximab - 375 mg/m2 once weekly for the first 4 weeks and Lenalidomide - 20 mg daily on days 1 through 21 of every 28-day cycle for 12 cycles. Discussed side effect profile of each. - 10/18/23 vs and CBC reviewed; .83, hgb 13.3, plt 247. Extensive discussion with patient and brother regarding plan of care. Mantle cell lymphoma based on BMBx involving bone marrow. s/p PET/CT 10/2/23 revealing Borderline enlarged spleen. No evidence of FDG avid malignancy. Given high risk score and continued increase to WBC, recommend treatment. Again reviewed both regimens as above. Patient prefers to not take pills daily and also lives far and need transportation from [facility]. She prefers Ritux/ Bendamustane regimen given monthlyl regimen D1/D2. Reviewed Bendamustine - 90 mg/m intravenously over 30-60 minutes on days 1 and 2 of a four-week cycle and Rituximab - 375 mg/m intravenously on day 1 of each cycle up to 6 cycles,followed by maintenance rituxan q8 weeks for 2-3 years. Reviewed side effects and logistics. Hand outs provided to patient for review. Will need port placement. Order entered. Hepatitis negative. Will send EMLA and Zofran. - 11/6/23 vs and CBC reviewed; .52, hgb 12.3, plt 205. Due to start D1/D2 ritux/bendamustane today. Again reviewed side effects. s/p port placement. Sent EMLA cream and Zofran. Proceed with treatment today. Given patient does not live close will arrange for APEX labs to check labs in 1-2 weeks following C1.  - 12/4/23 vs and CBC reviewed; WBC 19.23, hgb 11, plt 167. Patient also had APEX labs done 2 weeks following treatment. Uric acid 7.4, Repeat today. If remains elevated or above 8 will assess allopurinol should be added. Patient has had significant response to WBC following 1 cycle 110-->19. Vital signs 133/63, HR 64, 97F stable and okay to proceed with treatment. Patient did not have any fevers following last treatment but did have some fatigue and foot swelling following last cycle about 2 weeks after treatment. Energy and fatigue significantly improved and she presents back to her baseline today. Advised to continue to elevate legs. Will order b/l US today to rule out DVT. Based on vital signs and labs okay to proceed with treatment today. Will return tomorrow for D2 bendamustane. To also be seen by [facility] in nutrition.  - 1/4/24 - vs and labs reviewed.  hospitalized with bacteremia. Given lock therapy in PORT. pending repeat blood cultures. If blood cultures negative will proceed with cycle 3 of BR.  - 1/15/24 - vs and labs reviewed. blood cultures are negative. Ok to proceed with BR cycle 3 - will split dose of rituxan per patient preference. wbc 5.81, hgb 10.8, plts 176. - 2/5/24 vs and CBC reviewed; WBC 5.97, hgb 10.8, plt 174, ANC 4.94. s/p C4 BR. Proceed with C4 today. Remains on split dose due to patient preference. 2 days early for tx this mos as Mon/Tues work better for her. Reviewed with pharmacy and okay to proceed. No s/s infection. s/p UTI 1/25/24 s/p completion of Macrobid. Port to be Deaccessed following D1 treatment and reaccessed D2.  - 3/4/24 vs and CBC reviewed; WBC 4.07, hgb 10.7, plt 151 ANC 2.88. Last few cycles she was receiving split dose. Discussed with Dr. Garcia since tolerating will give full dose D1 today. Reviewed with patient and son. Agreed. Proceed with C5 today - 4/1/24 - vs and labs reviewed. labs drawn in office today. wbc 3.99, plts 125, hgb 10.8. monitor. proceed with cycle 6 of BR. She does not want a BM biopsy to assess response. Her lymphocyte counts have dropped dramatically thus likely had a response. PET CT did not reveal anything prior. Will continue rituxan q 2 months for 2-3 years for maintenance - 6/3/24 vs and CBC reviewed; WBC 5.05, hgb 11.3, plt 171. Proceed with q2mos rituxan today. Feels well. s/p tooth extraction since last visit was placed on amoxicillin and now completed. No s/s of infection   #Weakness/Debility - s/p hospitalization for bacteremia  - Ordered PT - s/p start of home PT, moving better  - s/p fall after eyes were dilated at optho sent to [facility] from MD office, per patient and son all work up negative. Ambulating well and no pain   #Leg Swelling  - Started on lasix by PCP  - Significantly improved   #Thyroid nodule - s/p US 9/2022 revealing heterogenous thyroid gland. Recommend US guided FNA L sided nodule - s/p bx pathology benign Consistent with a follicular/colloid nodule with cystic degeneration  #Health Maintenance - PCP Dr. Charlton  - Pain Management Dr. Graf s/p recent cortisone injections - Urology Dr. Albrecht  - CNY 2016, given family hx recommend follow up with GI to assess need for another CNY, s/p follow up with GI  - Mammo/Sono PET/CT without evidence of concern  RTC 8 weeks with CBC with diff, CMP, Mg, Phos, LDH, ESR/CRP  Case and management discussed with Dr. garcia  [AdvancecareDate] : 01/10/2024

## 2024-07-29 NOTE — PHYSICAL EXAM
[Ambulatory and capable of all self care but unable to carry out any work activities] : Status 2- Ambulatory and capable of all self care but unable to carry out any work activities. Up and about more than 50% of waking hours [Normal] : affect appropriate [de-identified] : no adenopathy  [de-identified] : no axillary adenopathy  [de-identified] : no inquinal adenopathy

## 2024-07-29 NOTE — PHYSICAL EXAM
[Ambulatory and capable of all self care but unable to carry out any work activities] : Status 2- Ambulatory and capable of all self care but unable to carry out any work activities. Up and about more than 50% of waking hours [Normal] : affect appropriate [de-identified] : no adenopathy  [de-identified] : no axillary adenopathy  [de-identified] : no inquinal adenopathy

## 2024-08-05 ENCOUNTER — RESULT REVIEW (OUTPATIENT)
Age: 84
End: 2024-08-05

## 2024-08-05 ENCOUNTER — APPOINTMENT (OUTPATIENT)
Dept: HEMATOLOGY ONCOLOGY | Facility: CLINIC | Age: 84
End: 2024-08-05

## 2024-08-05 PROCEDURE — 36415 COLL VENOUS BLD VENIPUNCTURE: CPT

## 2024-08-05 PROCEDURE — 99214 OFFICE O/P EST MOD 30 MIN: CPT | Mod: 25

## 2024-08-05 NOTE — HISTORY OF PRESENT ILLNESS
[de-identified] : Ms. Valladares is an 82 year old woman who presents as a hospital follow up.\par  \par  She presented to [place] ED 2022 with generalized fatigue, chills, nausea, poor PO intake, and 20lb weight loss over 1 month\par  \par  Blood work during hospitalization revealed WBC 25-32, hgb 10-1, lymphocytes up to 80%, NIKKI, , K:L 2.04, \par  \par  Flow: Peripheral blood, flow cytometric immunophenotyping: Involved by a B-cell lymphoproliferative disorder with partial CD5 expression, kappa light chain-restricted.  The\par  differential diagnosis for further subclassification of this process includes marginal zone lymphoma, lymphoplasmacytic lymphoma, follicular lymphoma, and possibly chronic lymphocytic leukemia/small lymphocytic lymphoma or mantle cell lymphoma.  In addition, cyclin D1 immunohistochemistry on a bone marrow biopsy or FISH forCCND1/IGH might be considered.  If FISH testing is desired on this specimen, please call the signing pathologist.\par  \par  WBC:  21.9 x 10(9)/L\par  %Lymphs (CBC/automated differential):  75%\par  #Lymphs (CBC/automated differential):  16.5 x 10(9)/L\par  Results:\par  Blasts:  Not increased by CD45/side scatter and CD34.\par  B-cells:  Monotypic kappa\par  \par  Age of Menarche: 13\par  Age of Menopause: 50s\par  OCP/HRT: OCP prior to menopause, no HRT.\par  \par  \par  Health Maintenance:\par  Mammo: 3/2022, q6months\par  GYN:\par  CNY: 2016\par  DEXA: unknown when last done. \par  She has a personal history of lymphoma dx 20 years ago and was managed by PCP.\par  PMH: CAD w/ PCI, HTN, Breast Cancer  s/p lumpectomy/RT/Tamoxifen, ? Mantle Cell Lymphoma.  [de-identified] : Patient presents today for routine follow up. She is now on maintenance rituxan q2 mos. Does not want BMbx. Feels well. She recently had epidural with pain management MD to L spine. Tapered off gabapentin on own but takes for insomnia. She is doing PT 2x/week at home. Has not had any falls. Denies fevers/chills, HA, dizziness, SOB, cough, CP, palpitations, abd pain, n/v/d, swelling to extremities, back pain, hematuria, BRBPR, abnormal bleeding.

## 2024-08-05 NOTE — HISTORY OF PRESENT ILLNESS
[de-identified] : Ms. Valladares is an 82 year old woman who presents as a hospital follow up.\par  \par  She presented to [place] ED 2022 with generalized fatigue, chills, nausea, poor PO intake, and 20lb weight loss over 1 month\par  \par  Blood work during hospitalization revealed WBC 25-32, hgb 10-1, lymphocytes up to 80%, NIKKI, , K:L 2.04, \par  \par  Flow: Peripheral blood, flow cytometric immunophenotyping: Involved by a B-cell lymphoproliferative disorder with partial CD5 expression, kappa light chain-restricted.  The\par  differential diagnosis for further subclassification of this process includes marginal zone lymphoma, lymphoplasmacytic lymphoma, follicular lymphoma, and possibly chronic lymphocytic leukemia/small lymphocytic lymphoma or mantle cell lymphoma.  In addition, cyclin D1 immunohistochemistry on a bone marrow biopsy or FISH forCCND1/IGH might be considered.  If FISH testing is desired on this specimen, please call the signing pathologist.\par  \par  WBC:  21.9 x 10(9)/L\par  %Lymphs (CBC/automated differential):  75%\par  #Lymphs (CBC/automated differential):  16.5 x 10(9)/L\par  Results:\par  Blasts:  Not increased by CD45/side scatter and CD34.\par  B-cells:  Monotypic kappa\par  \par  Age of Menarche: 13\par  Age of Menopause: 50s\par  OCP/HRT: OCP prior to menopause, no HRT.\par  \par  \par  Health Maintenance:\par  Mammo: 3/2022, q6months\par  GYN:\par  CNY: 2016\par  DEXA: unknown when last done. \par  She has a personal history of lymphoma dx 20 years ago and was managed by PCP.\par  PMH: CAD w/ PCI, HTN, Breast Cancer  s/p lumpectomy/RT/Tamoxifen, ? Mantle Cell Lymphoma.  [de-identified] : Patient presents today for routine follow up. She is now on maintenance rituxan q2 mos. Does not want BMbx. Feels well. She recently had epidural with pain management MD to L spine. Tapered off gabapentin on own but takes for insomnia. She is doing PT 2x/week at home. Has not had any falls. Denies fevers/chills, HA, dizziness, SOB, cough, CP, palpitations, abd pain, n/v/d, swelling to extremities, back pain, hematuria, BRBPR, abnormal bleeding.

## 2024-08-05 NOTE — ASSESSMENT
[With Patient/Caregiver] : With Patient/Caregiver [Designated Health Care Proxy] : Designated Health Care Proxy [Name: ___] : Name: [unfilled] [Relationship: ___] : Relationship: [unfilled] [DNR] : DNR [DNI] : DNI [Last Verification Date: ___] : Last Verification Date: [unfilled] [FreeTextEntry1] : #Leukocytosis 2/2 mantle lymphoma - Peripheral blood, flow cytometric immunophenotyping: Involved by a B-cell lymphoproliferative disorder with partial CD5 expression, kappa light chain-restricted. - The differential diagnosis for further subclassification of this process includes marginal zone lymphoma, lymphoplasmacytic lymphoma, follicular lymphoma, and possibly chronic lymphocytic leukemia/small lymphocytic lymphoma or mantle cell lymphoma. - s/p PET/CT 10/2022 revealing left upper lobe patchy groundglass opacities with mild FDG uptake to SUV 3.0, likely of nonspecific infectious/inflammatory etiology. No additional abnormal foci of FDG uptake to suggest biologic tumor activity. - No B symptoms - If consistent with MCL, patients with low tumor burden, low-risk , disease may have an indolent course, managed by observation. - May consider BR in future if needed. - Currently does not have indication for treatment as there is no anemia, thrombocytopenia, B symptoms, bulky adenopathy. - 5/26/23 vs and CBC reviewed, WBC 66.95, hgb 12.5, plt 236. Reviewed case with Dr. Tran. Reviewed trend of WBC with WBC doubling time of 5 mos. [facility] mutational analysis and FISH CLL panel to be completed today. Reviewed PET/CT. Repeat CT Chest to evaluate ground class opacities. Continues to deny B symptoms. No adenopathy on exam. Weight stable. LDH now slightly elevated at 237. RTC in 6 weeks to monitor blood counts more closely. Repeat flow at next visit. Plan of care discussed with patient and brother Aaron. - 8/10/23 BMBx today. wbc 91k - 9/6/23 - vs and labs reviewed. labs drawn in office today. wbc 103k, hgb 12.3, plts 232. BM bx with MCL 70-80% involvement. 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 MIPI - high risk. Recommend Bendamustine - 90 mg/m intravenously over 30-60 minutes on days 1 and 2 of a four-week cycle and Rituximab - 375 mg/m intravenously on day 1 of each cycle up to 6 cycles,followed by maintenance rituxan q8 weeks for 2-3 years. Check repeat PET CT as counts have been consistently rising. Another option could be Rituximab - 375 mg/m2 once weekly for the first 4 weeks and Lenalidomide - 20 mg daily on days 1 through 21 of every 28-day cycle for 12 cycles. Discussed side effect profile of each. - 10/18/23 vs and CBC reviewed; .83, hgb 13.3, plt 247. Extensive discussion with patient and brother regarding plan of care. Mantle cell lymphoma based on BMBx involving bone marrow. s/p PET/CT 10/2/23 revealing Borderline enlarged spleen. No evidence of FDG avid malignancy. Given high risk score and continued increase to WBC, recommend treatment. Again reviewed both regimens as above. Patient prefers to not take pills daily and also lives far and need transportation from [facility]. She prefers Ritux/ Bendamustane regimen given monthlyl regimen D1/D2. Reviewed Bendamustine - 90 mg/m intravenously over 30-60 minutes on days 1 and 2 of a four-week cycle and Rituximab - 375 mg/m intravenously on day 1 of each cycle up to 6 cycles,followed by maintenance rituxan q8 weeks for 2-3 years. Reviewed side effects and logistics. Hand outs provided to patient for review. Will need port placement. Order entered. Hepatitis negative. Will send EMLA and Zofran. - 11/6/23 vs and CBC reviewed; .52, hgb 12.3, plt 205. Due to start D1/D2 ritux/bendamustane today. Again reviewed side effects. s/p port placement. Sent EMLA cream and Zofran. Proceed with treatment today. Given patient does not live close will arrange for APEX labs to check labs in 1-2 weeks following C1.  - 12/4/23 vs and CBC reviewed; WBC 19.23, hgb 11, plt 167. Patient also had APEX labs done 2 weeks following treatment. Uric acid 7.4, Repeat today. If remains elevated or above 8 will assess allopurinol should be added. Patient has had significant response to WBC following 1 cycle 110-->19. Vital signs 133/63, HR 64, 97F stable and okay to proceed with treatment. Patient did not have any fevers following last treatment but did have some fatigue and foot swelling following last cycle about 2 weeks after treatment. Energy and fatigue significantly improved and she presents back to her baseline today. Advised to continue to elevate legs. Will order b/l US today to rule out DVT. Based on vital signs and labs okay to proceed with treatment today. Will return tomorrow for D2 bendamustane. To also be seen by [facility] in nutrition.  - 1/4/24 - vs and labs reviewed.  hospitalized with bacteremia. Given lock therapy in PORT. pending repeat blood cultures. If blood cultures negative will proceed with cycle 3 of BR.  - 1/15/24 - vs and labs reviewed. blood cultures are negative. Ok to proceed with BR cycle 3 - will split dose of rituxan per patient preference. wbc 5.81, hgb 10.8, plts 176. - 2/5/24 vs and CBC reviewed; WBC 5.97, hgb 10.8, plt 174, ANC 4.94. s/p C4 BR. Proceed with C4 today. Remains on split dose due to patient preference. 2 days early for tx this mos as Mon/Tues work better for her. Reviewed with pharmacy and okay to proceed. No s/s infection. s/p UTI 1/25/24 s/p completion of Macrobid. Port to be Deaccessed following D1 treatment and reaccessed D2.  - 3/4/24 vs and CBC reviewed; WBC 4.07, hgb 10.7, plt 151 ANC 2.88. Last few cycles she was receiving split dose. Discussed with Dr. Garcia since tolerating will give full dose D1 today. Reviewed with patient and son. Agreed. Proceed with C5 today - 4/1/24 - vs and labs reviewed. labs drawn in office today. wbc 3.99, plts 125, hgb 10.8. monitor. proceed with cycle 6 of BR. She does not want a BM biopsy to assess response. Her lymphocyte counts have dropped dramatically thus likely had a response. PET CT did not reveal anything prior. Will continue rituxan q 2 months for 2-3 years for maintenance - 6/3/24 vs and CBC reviewed; WBC 5.05, hgb 11.3, plt 171. Proceed with q2mos rituxan today. Feels well. s/p tooth extraction since last visit was placed on amoxicillin and now completed. No s/s of infection  - 8/5/24 vs and CBC reviewed; WBC 5.78, hgb 11.4, plt 185, ANC wnl. Proceed with q2mos rituxan today   #Weakness/Debility - s/p hospitalization for bacteremia  - Ordered PT - s/p start of home PT, moving better  - s/p fall after eyes were dilated at optho sent to [facility] from [place] office, per patient and son all work up negative. Ambulating well and no pain  - 8/2024 Restarted PT 2x/week at home   #Leg Swelling  - Started on lasix by PCP  - Significantly improved   #Thyroid nodule - s/p US 9/2022 revealing heterogenous thyroid gland. Recommend US guided FNA L sided nodule - s/p bx pathology benign Consistent with a follicular/colloid nodule with cystic degeneration  #Health Maintenance - PCP Dr. Charlton  - Pain Management Dr. Graf s/p recent cortisone injections and recent epidural. On gabapentin did stop on own and takes prn advised to discuss with Dr. Graf how regimen  - Urology Dr. Albrecht  - CNY 2016, given family hx recommend follow up with GI to assess need for another CNY, s/p follow up with GI  - Mammo/Sono PET/CT without evidence of concern  RTC 8 weeks with CBC with diff, CMP, Mg, Phos, LDH, ESR/CRP  Case and management discussed with Dr. garcia  [AdvancecareDate] : 01/10/2024

## 2024-08-05 NOTE — ASSESSMENT
[With Patient/Caregiver] : With Patient/Caregiver [Designated Health Care Proxy] : Designated Health Care Proxy [Name: ___] : Name: [unfilled] [Relationship: ___] : Relationship: [unfilled] [DNR] : DNR [DNI] : DNI [Last Verification Date: ___] : Last Verification Date: [unfilled] [FreeTextEntry1] : #Leukocytosis 2/2 mantle lymphoma - Peripheral blood, flow cytometric immunophenotyping: Involved by a B-cell lymphoproliferative disorder with partial CD5 expression, kappa light chain-restricted. - The differential diagnosis for further subclassification of this process includes marginal zone lymphoma, lymphoplasmacytic lymphoma, follicular lymphoma, and possibly chronic lymphocytic leukemia/small lymphocytic lymphoma or mantle cell lymphoma. - s/p PET/CT 10/2022 revealing left upper lobe patchy groundglass opacities with mild FDG uptake to SUV 3.0, likely of nonspecific infectious/inflammatory etiology. No additional abnormal foci of FDG uptake to suggest biologic tumor activity. - No B symptoms - If consistent with MCL, patients with low tumor burden, low-risk , disease may have an indolent course, managed by observation. - May consider BR in future if needed. - Currently does not have indication for treatment as there is no anemia, thrombocytopenia, B symptoms, bulky adenopathy. - 5/26/23 vs and CBC reviewed, WBC 66.95, hgb 12.5, plt 236. Reviewed case with Dr. Tran. Reviewed trend of WBC with WBC doubling time of 5 mos. IGVH mutational analysis and FISH CLL panel to be completed today. Reviewed PET/CT. Repeat CT Chest to evaluate ground class opacities. Continues to deny B symptoms. No adenopathy on exam. Weight stable. LDH now slightly elevated at 237. RTC in 6 weeks to monitor blood counts more closely. Repeat flow at next visit. Plan of care discussed with patient and brother Aaron. - 8/10/23 BMBx today. wbc 91k - 9/6/23 - vs and labs reviewed. labs drawn in office today. wbc 103k, hgb 12.3, plts 232. BM bx with MCL 70-80% involvement. 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 MIPI - high risk. Recommend Bendamustine - 90 mg/m intravenously over 30-60 minutes on days 1 and 2 of a four-week cycle and Rituximab - 375 mg/m intravenously on day 1 of each cycle up to 6 cycles,followed by maintenance rituxan q8 weeks for 2-3 years. Check repeat PET CT as counts have been consistently rising. Another option could be Rituximab - 375 mg/m2 once weekly for the first 4 weeks and Lenalidomide - 20 mg daily on days 1 through 21 of every 28-day cycle for 12 cycles. Discussed side effect profile of each. - 10/18/23 vs and CBC reviewed; .83, hgb 13.3, plt 247. Extensive discussion with patient and brother regarding plan of care. Mantle cell lymphoma based on BMBx involving bone marrow. s/p PET/CT 10/2/23 revealing Borderline enlarged spleen. No evidence of FDG avid malignancy. Given high risk score and continued increase to WBC, recommend treatment. Again reviewed both regimens as above. Patient prefers to not take pills daily and also lives far and need transportation from [facility]. She prefers Ritux/ Bendamustane regimen given monthlyl regimen D1/D2. Reviewed Bendamustine - 90 mg/m intravenously over 30-60 minutes on days 1 and 2 of a four-week cycle and Rituximab - 375 mg/m intravenously on day 1 of each cycle up to 6 cycles,followed by maintenance rituxan q8 weeks for 2-3 years. Reviewed side effects and logistics. Hand outs provided to patient for review. Will need port placement. Order entered. Hepatitis negative. Will send EMLA and Zofran. - 11/6/23 vs and CBC reviewed; .52, hgb 12.3, plt 205. Due to start D1/D2 ritux/bendamustane today. Again reviewed side effects. s/p port placement. Sent EMLA cream and Zofran. Proceed with treatment today. Given patient does not live close will arrange for APEX labs to check labs in 1-2 weeks following C1.  - 12/4/23 vs and CBC reviewed; WBC 19.23, hgb 11, plt 167. Patient also had APEX labs done 2 weeks following treatment. Uric acid 7.4, Repeat today. If remains elevated or above 8 will assess allopurinol should be added. Patient has had significant response to WBC following 1 cycle 110-->19. Vital signs 133/63, HR 64, 97F stable and okay to proceed with treatment. Patient did not have any fevers following last treatment but did have some fatigue and foot swelling following last cycle about 2 weeks after treatment. Energy and fatigue significantly improved and she presents back to her baseline today. Advised to continue to elevate legs. Will order b/l US today to rule out DVT. Based on vital signs and labs okay to proceed with treatment today. Will return tomorrow for D2 bendamustane. To also be seen by [facility] in nutrition.  - 1/4/24 - vs and labs reviewed.  hospitalized with bacteremia. Given lock therapy in [facility]. pending repeat blood cultures. If blood cultures negative will proceed with cycle 3 of BR.  - 1/15/24 - vs and labs reviewed. blood cultures are negative. Ok to proceed with BR cycle 3 - will split dose of rituxan per patient preference. wbc 5.81, hgb 10.8, plts 176. - 2/5/24 vs and CBC reviewed; WBC 5.97, hgb 10.8, plt 174, ANC 4.94. s/p C4 BR. Proceed with C4 today. Remains on split dose due to patient preference. 2 days early for tx this mos as Mon/Tues work better for her. Reviewed with pharmacy and okay to proceed. No s/s infection. s/p UTI 1/25/24 s/p completion of Macrobid. Port to be Deaccessed following D1 treatment and reaccessed D2.  - 3/4/24 vs and CBC reviewed; WBC 4.07, hgb 10.7, plt 151 ANC 2.88. Last few cycles she was receiving split dose. Discussed with Dr. Garcia since tolerating will give full dose D1 today. Reviewed with patient and son. Agreed. Proceed with C5 today - 4/1/24 - vs and labs reviewed. labs drawn in office today. wbc 3.99, plts 125, hgb 10.8. monitor. proceed with cycle 6 of BR. She does not want a BM biopsy to assess response. Her lymphocyte counts have dropped dramatically thus likely had a response. PET CT did not reveal anything prior. Will continue rituxan q 2 months for 2-3 years for maintenance - 6/3/24 vs and CBC reviewed; WBC 5.05, hgb 11.3, plt 171. Proceed with q2mos rituxan today. Feels well. s/p tooth extraction since last visit was placed on amoxicillin and now completed. No s/s of infection  - 8/5/24 vs and CBC reviewed; WBC 5.78, hgb 11.4, plt 185, ANC wnl. Proceed with q2mos rituxan today   #Weakness/Debility - s/p hospitalization for bacteremia  - Ordered PT - s/p start of home PT, moving better  - s/p fall after eyes were dilated at optho sent to [facility] from [place] office, per patient and son all work up negative. Ambulating well and no pain  - 8/2024 Restarted PT 2x/week at home   #Leg Swelling  - Started on lasix by PCP  - Significantly improved   #Thyroid nodule - s/p US 9/2022 revealing heterogenous thyroid gland. Recommend US guided FNA L sided nodule - s/p bx pathology benign Consistent with a follicular/colloid nodule with cystic degeneration  #Health Maintenance - PCP Dr. Charlton  - Pain Management Dr. Graf s/p recent cortisone injections and recent epidural. On gabapentin did stop on own and takes prn advised to discuss with Dr. Graf how regimen  - Urology Dr. Albrecht  - CNY 2016, given family hx recommend follow up with GI to assess need for another CNY, s/p follow up with GI  - Mammo/Sono PET/CT without evidence of concern  RTC 8 weeks with CBC with diff, CMP, Mg, Phos, LDH, ESR/CRP  Case and management discussed with Dr. garcia  [AdvancecareDate] : 01/10/2024

## 2024-08-05 NOTE — REVIEW OF SYSTEMS
[Fatigue] : fatigue [Diarrhea: Grade 0] : Diarrhea: Grade 0 [Anxiety] : anxiety [Easy Bruising] : a tendency for easy bruising [Negative] : Allergic/Immunologic [Recent Change In Weight] : ~T recent weight change [Joint Pain] : joint pain [Fever] : no fever [Chills] : no chills [Night Sweats] : no night sweats [Abdominal Pain] : no abdominal pain [Vomiting] : no vomiting [Constipation] : no constipation [FreeTextEntry8] : urinary frequency at baseline  [FreeTextEntry2] : +weight gain appetite better [FreeTextEntry9] : low back pain chronic

## 2024-09-09 ENCOUNTER — LABORATORY RESULT (OUTPATIENT)
Age: 84
End: 2024-09-09

## 2024-09-09 ENCOUNTER — APPOINTMENT (OUTPATIENT)
Dept: FAMILY MEDICINE | Facility: CLINIC | Age: 84
End: 2024-09-09
Payer: MEDICARE

## 2024-09-09 VITALS
HEIGHT: 59 IN | TEMPERATURE: 99.4 F | DIASTOLIC BLOOD PRESSURE: 70 MMHG | WEIGHT: 118 LBS | BODY MASS INDEX: 23.79 KG/M2 | HEART RATE: 66 BPM | SYSTOLIC BLOOD PRESSURE: 150 MMHG

## 2024-09-09 DIAGNOSIS — I10 ESSENTIAL (PRIMARY) HYPERTENSION: ICD-10-CM

## 2024-09-09 DIAGNOSIS — E78.5 HYPERLIPIDEMIA, UNSPECIFIED: ICD-10-CM

## 2024-09-09 DIAGNOSIS — M48.061 SPINAL STENOSIS, LUMBAR REGION WITHOUT NEUROGENIC CLAUDICATION: ICD-10-CM

## 2024-09-09 DIAGNOSIS — E55.9 VITAMIN D DEFICIENCY, UNSPECIFIED: ICD-10-CM

## 2024-09-09 DIAGNOSIS — Z00.00 ENCOUNTER FOR GENERAL ADULT MEDICAL EXAMINATION W/OUT ABNORMAL FINDINGS: ICD-10-CM

## 2024-09-09 DIAGNOSIS — I25.10 ATHEROSCLEROTIC HEART DISEASE OF NATIVE CORONARY ARTERY W/OUT ANGINA PECTORIS: ICD-10-CM

## 2024-09-09 DIAGNOSIS — E04.1 NONTOXIC SINGLE THYROID NODULE: ICD-10-CM

## 2024-09-09 DIAGNOSIS — E87.1 HYPO-OSMOLALITY AND HYPONATREMIA: ICD-10-CM

## 2024-09-09 DIAGNOSIS — F32.A ANXIETY DISORDER, UNSPECIFIED: ICD-10-CM

## 2024-09-09 DIAGNOSIS — C83.10 MANTLE CELL LYMPHOMA, UNSPECIFIED SITE: ICD-10-CM

## 2024-09-09 DIAGNOSIS — D72.820 LYMPHOCYTOSIS (SYMPTOMATIC): ICD-10-CM

## 2024-09-09 DIAGNOSIS — F41.9 ANXIETY DISORDER, UNSPECIFIED: ICD-10-CM

## 2024-09-09 DIAGNOSIS — N32.81 OVERACTIVE BLADDER: ICD-10-CM

## 2024-09-09 PROCEDURE — 36415 COLL VENOUS BLD VENIPUNCTURE: CPT

## 2024-09-09 PROCEDURE — G0439: CPT

## 2024-09-10 LAB
25(OH)D3 SERPL-MCNC: 37 NG/ML
ALBUMIN SERPL ELPH-MCNC: 4 G/DL
ALP BLD-CCNC: 84 U/L
ALT SERPL-CCNC: 8 U/L
ANION GAP SERPL CALC-SCNC: 13 MMOL/L
APPEARANCE: CLEAR
AST SERPL-CCNC: 17 U/L
BASOPHILS # BLD AUTO: 0.05 K/UL
BASOPHILS NFR BLD AUTO: 1 %
BILIRUB SERPL-MCNC: 0.7 MG/DL
BILIRUBIN URINE: NEGATIVE
BLOOD URINE: NEGATIVE
BUN SERPL-MCNC: 16 MG/DL
CALCIUM SERPL-MCNC: 9.1 MG/DL
CHLORIDE SERPL-SCNC: 106 MMOL/L
CHOLEST SERPL-MCNC: 140 MG/DL
CO2 SERPL-SCNC: 24 MMOL/L
COLOR: YELLOW
CREAT SERPL-MCNC: 1.09 MG/DL
EGFR: 50 ML/MIN/1.73M2
EOSINOPHIL # BLD AUTO: 0.12 K/UL
EOSINOPHIL NFR BLD AUTO: 2.4 %
ESTIMATED AVERAGE GLUCOSE: 103 MG/DL
FOLATE SERPL-MCNC: 5.7 NG/ML
GLUCOSE QUALITATIVE U: NEGATIVE MG/DL
GLUCOSE SERPL-MCNC: 78 MG/DL
HBA1C MFR BLD HPLC: 5.2 %
HCT VFR BLD CALC: 39.6 %
HDLC SERPL-MCNC: 61 MG/DL
HGB BLD-MCNC: 12.2 G/DL
IMM GRANULOCYTES NFR BLD AUTO: 0.2 %
KETONES URINE: NEGATIVE MG/DL
LDLC SERPL CALC-MCNC: 65 MG/DL
LEUKOCYTE ESTERASE URINE: ABNORMAL
LYMPHOCYTES # BLD AUTO: 0.37 K/UL
LYMPHOCYTES NFR BLD AUTO: 7.3 %
MAN DIFF?: NORMAL
MCHC RBC-ENTMCNC: 29.8 PG
MCHC RBC-ENTMCNC: 30.8 GM/DL
MCV RBC AUTO: 96.8 FL
MONOCYTES # BLD AUTO: 0.49 K/UL
MONOCYTES NFR BLD AUTO: 9.7 %
NEUTROPHILS # BLD AUTO: 4.01 K/UL
NEUTROPHILS NFR BLD AUTO: 79.4 %
NITRITE URINE: NEGATIVE
NONHDLC SERPL-MCNC: 79 MG/DL
PH URINE: 5.5
PLATELET # BLD AUTO: 158 K/UL
POTASSIUM SERPL-SCNC: 4.3 MMOL/L
PROT SERPL-MCNC: 6.2 G/DL
PROTEIN URINE: NORMAL MG/DL
RBC # BLD: 4.09 M/UL
RBC # FLD: 13.8 %
SODIUM SERPL-SCNC: 144 MMOL/L
SPECIFIC GRAVITY URINE: 1.02
TRIGL SERPL-MCNC: 68 MG/DL
TSH SERPL-ACNC: 0.5 UIU/ML
UROBILINOGEN URINE: 0.2 MG/DL
VIT B12 SERPL-MCNC: 398 PG/ML
WBC # FLD AUTO: 5.05 K/UL

## 2024-09-10 RX ORDER — GABAPENTIN 300 MG/1
300 CAPSULE ORAL
Refills: 0 | Status: ACTIVE | COMMUNITY

## 2024-09-10 RX ORDER — TOBRAMYCIN AND DEXAMETHASONE 3; 1 MG/ML; MG/ML
0.3-0.1 SUSPENSION/ DROPS OPHTHALMIC
Qty: 5 | Refills: 0 | Status: ACTIVE | COMMUNITY
Start: 2024-06-10

## 2024-09-10 NOTE — PLAN
[FreeTextEntry1] : 83 yo F with mantle cell lymphoma, on maintenance therapy, CAD s/p stent placement, spinal stenosis, overactive bladder, HLD, thyroid nodule, hx of alcohol abuse previously, presenting for annual physical. Patient reports no acute concerns or complaints at this time. She denies any headaches, chest pain, shortness of breath, nausea/vomiting, diarrhea. She does complain of getting up to urinate multiple times a night and that affects her daily life.  Mantle cell lymphoma - s/p chemotherapy, now on maintenance rituxan q2 months, labs monitored q2 months by hematology/oncology CAD - follow up cardiology, on statin, carvedilol, and aspirin daily Spinal stenosis - follows with Dr. Graf, injections, on gabapentin Overactive bladder - recommend evaluation by urology, referral provided HLD - monitor lipids, on statin Trigger finger of right hand - monitor progression, declines evaluation by hand surgery at this time Recommend bone density test All questions answered Labs drawn in office

## 2024-09-10 NOTE — HISTORY OF PRESENT ILLNESS
[FreeTextEntry1] : Annual [de-identified] : 83 yo F with mantle cell lymphoma, on maintenance therapy, CAD s/p stent placement, spinal stenosis, overactive bladder, HLD, thyroid nodule, hx of alcohol abuse previously, presenting for annual physical. Patient reports no acute concerns or complaints at this time. She denies any headaches, chest pain, shortness of breath, nausea/vomiting, diarrhea. She does complain of getting up to urinate multiple times a night and that affects her daily life.

## 2024-09-10 NOTE — HISTORY OF PRESENT ILLNESS
[FreeTextEntry1] : Annual [de-identified] : 85 yo F with mantle cell lymphoma, on maintenance therapy, CAD s/p stent placement, spinal stenosis, overactive bladder, HLD, thyroid nodule, hx of alcohol abuse previously, presenting for annual physical. Patient reports no acute concerns or complaints at this time. She denies any headaches, chest pain, shortness of breath, nausea/vomiting, diarrhea. She does complain of getting up to urinate multiple times a night and that affects her daily life.

## 2024-09-10 NOTE — HEALTH RISK ASSESSMENT
[No] : No [One fall no injury in past year] : Patient reported one fall in the past year without injury [Assistive Device] : Patient uses an assistive device [0] : 1) Little interest or pleasure doing things: Not at all (0) [1] : 2) Feeling down, depressed, or hopeless for several days (1) [Never] : Never [Patient reported bone density results were normal] : Patient reported bone density results were normal [Patient reported colonoscopy was normal] : Patient reported colonoscopy was normal [Fully functional (bathing, dressing, toileting, transferring, walking, feeding)] : Fully functional (bathing, dressing, toileting, transferring, walking, feeding) [Fully functional (using the telephone, shopping, preparing meals, housekeeping, doing laundry, using] : Fully functional and needs no help or supervision to perform IADLs (using the telephone, shopping, preparing meals, housekeeping, doing laundry, using transportation, managing medications and managing finances) [Good] : ~his/her~  mood as  good [PHQ-2 Negative - No further assessment needed] : PHQ-2 Negative - No further assessment needed [de-identified] : SILVIANO [[id number]] : 1 [Patient declined mammogram] : Patient declined mammogram [With Family] : lives with family [Retired] : retired [Feels Safe at Home] : Feels safe at home [BoneDensityComments] : MORE THAN 2 YEARS, REPORTS NORMAL  [ColonoscopyDate] : 01/16

## 2024-09-10 NOTE — HEALTH RISK ASSESSMENT
[No] : No [One fall no injury in past year] : Patient reported one fall in the past year without injury [Assistive Device] : Patient uses an assistive device [0] : 1) Little interest or pleasure doing things: Not at all (0) [1] : 2) Feeling down, depressed, or hopeless for several days (1) [Never] : Never [Patient reported bone density results were normal] : Patient reported bone density results were normal [Patient reported colonoscopy was normal] : Patient reported colonoscopy was normal [Fully functional (bathing, dressing, toileting, transferring, walking, feeding)] : Fully functional (bathing, dressing, toileting, transferring, walking, feeding) [Fully functional (using the telephone, shopping, preparing meals, housekeeping, doing laundry, using] : Fully functional and needs no help or supervision to perform IADLs (using the telephone, shopping, preparing meals, housekeeping, doing laundry, using transportation, managing medications and managing finances) [Good] : ~his/her~  mood as  good [PHQ-2 Negative - No further assessment needed] : PHQ-2 Negative - No further assessment needed [de-identified] : SILVIANO [[id number]] : 1 [Patient declined mammogram] : Patient declined mammogram [With Family] : lives with family [Retired] : retired [Feels Safe at Home] : Feels safe at home [BoneDensityComments] : MORE THAN 2 YEARS, REPORTS NORMAL  [ColonoscopyDate] : 01/16

## 2024-10-07 ENCOUNTER — RESULT REVIEW (OUTPATIENT)
Age: 84
End: 2024-10-07

## 2024-10-07 ENCOUNTER — APPOINTMENT (OUTPATIENT)
Dept: HEMATOLOGY ONCOLOGY | Facility: CLINIC | Age: 84
End: 2024-10-07
Payer: MEDICARE

## 2024-10-07 ENCOUNTER — NON-APPOINTMENT (OUTPATIENT)
Age: 84
End: 2024-10-07

## 2024-10-07 VITALS
TEMPERATURE: 96.9 F | HEIGHT: 59 IN | OXYGEN SATURATION: 99 % | DIASTOLIC BLOOD PRESSURE: 72 MMHG | HEART RATE: 65 BPM | BODY MASS INDEX: 24.7 KG/M2 | RESPIRATION RATE: 16 BRPM | SYSTOLIC BLOOD PRESSURE: 115 MMHG | WEIGHT: 122.5 LBS

## 2024-10-07 DIAGNOSIS — C83.10 MANTLE CELL LYMPHOMA, UNSPECIFIED SITE: ICD-10-CM

## 2024-10-07 PROCEDURE — 36415 COLL VENOUS BLD VENIPUNCTURE: CPT

## 2024-10-07 PROCEDURE — 99214 OFFICE O/P EST MOD 30 MIN: CPT | Mod: 25

## 2024-10-07 RX ORDER — TURMERIC ROOT EXTRACT 500 MG
TABLET ORAL
Refills: 0 | Status: ACTIVE | COMMUNITY

## 2024-12-09 ENCOUNTER — APPOINTMENT (OUTPATIENT)
Dept: HEMATOLOGY ONCOLOGY | Facility: CLINIC | Age: 84
End: 2024-12-09
Payer: MEDICARE

## 2024-12-09 ENCOUNTER — RESULT REVIEW (OUTPATIENT)
Age: 84
End: 2024-12-09

## 2024-12-09 VITALS
HEART RATE: 61 BPM | HEIGHT: 59 IN | SYSTOLIC BLOOD PRESSURE: 143 MMHG | TEMPERATURE: 96.4 F | DIASTOLIC BLOOD PRESSURE: 64 MMHG | WEIGHT: 128.06 LBS | RESPIRATION RATE: 16 BRPM | OXYGEN SATURATION: 94 % | BODY MASS INDEX: 25.82 KG/M2

## 2024-12-09 DIAGNOSIS — C83.10 MANTLE CELL LYMPHOMA, UNSPECIFIED SITE: ICD-10-CM

## 2024-12-09 PROCEDURE — 99214 OFFICE O/P EST MOD 30 MIN: CPT | Mod: 25

## 2024-12-09 PROCEDURE — 36415 COLL VENOUS BLD VENIPUNCTURE: CPT

## 2025-02-10 ENCOUNTER — NON-APPOINTMENT (OUTPATIENT)
Age: 85
End: 2025-02-10

## 2025-02-10 ENCOUNTER — APPOINTMENT (OUTPATIENT)
Dept: HEMATOLOGY ONCOLOGY | Facility: CLINIC | Age: 85
End: 2025-02-10
Payer: MEDICARE

## 2025-02-10 ENCOUNTER — RESULT REVIEW (OUTPATIENT)
Age: 85
End: 2025-02-10

## 2025-02-10 VITALS
HEIGHT: 59 IN | DIASTOLIC BLOOD PRESSURE: 68 MMHG | BODY MASS INDEX: 23.68 KG/M2 | OXYGEN SATURATION: 96 % | WEIGHT: 117.44 LBS | SYSTOLIC BLOOD PRESSURE: 123 MMHG | RESPIRATION RATE: 16 BRPM | HEART RATE: 69 BPM | TEMPERATURE: 97.4 F

## 2025-02-10 DIAGNOSIS — C83.10 MANTLE CELL LYMPHOMA, UNSPECIFIED SITE: ICD-10-CM

## 2025-02-10 PROCEDURE — 99214 OFFICE O/P EST MOD 30 MIN: CPT | Mod: 25

## 2025-02-10 PROCEDURE — 36415 COLL VENOUS BLD VENIPUNCTURE: CPT

## 2025-03-10 ENCOUNTER — APPOINTMENT (OUTPATIENT)
Dept: FAMILY MEDICINE | Facility: CLINIC | Age: 85
End: 2025-03-10
Payer: MEDICARE

## 2025-03-10 VITALS
DIASTOLIC BLOOD PRESSURE: 60 MMHG | SYSTOLIC BLOOD PRESSURE: 120 MMHG | BODY MASS INDEX: 23.59 KG/M2 | WEIGHT: 117 LBS | HEART RATE: 59 BPM | OXYGEN SATURATION: 95 % | TEMPERATURE: 99.7 F | HEIGHT: 59 IN

## 2025-03-10 DIAGNOSIS — I10 ESSENTIAL (PRIMARY) HYPERTENSION: ICD-10-CM

## 2025-03-10 DIAGNOSIS — I87.2 VENOUS INSUFFICIENCY (CHRONIC) (PERIPHERAL): ICD-10-CM

## 2025-03-10 DIAGNOSIS — C83.10 MANTLE CELL LYMPHOMA, UNSPECIFIED SITE: ICD-10-CM

## 2025-03-10 DIAGNOSIS — M79.89 OTHER SPECIFIED SOFT TISSUE DISORDERS: ICD-10-CM

## 2025-03-10 PROCEDURE — G2211 COMPLEX E/M VISIT ADD ON: CPT

## 2025-03-10 PROCEDURE — 99214 OFFICE O/P EST MOD 30 MIN: CPT

## 2025-03-10 PROCEDURE — 36415 COLL VENOUS BLD VENIPUNCTURE: CPT

## 2025-03-11 PROBLEM — I87.2 VENOUS INSUFFICIENCY: Status: ACTIVE | Noted: 2025-03-11

## 2025-04-14 ENCOUNTER — NON-APPOINTMENT (OUTPATIENT)
Age: 85
End: 2025-04-14

## 2025-04-16 ENCOUNTER — APPOINTMENT (OUTPATIENT)
Dept: HEMATOLOGY ONCOLOGY | Facility: CLINIC | Age: 85
End: 2025-04-16
Payer: MEDICARE

## 2025-04-16 ENCOUNTER — RESULT REVIEW (OUTPATIENT)
Age: 85
End: 2025-04-16

## 2025-04-16 VITALS
TEMPERATURE: 97.3 F | HEART RATE: 70 BPM | RESPIRATION RATE: 16 BRPM | BODY MASS INDEX: 23.41 KG/M2 | HEIGHT: 59 IN | DIASTOLIC BLOOD PRESSURE: 56 MMHG | WEIGHT: 116.13 LBS | SYSTOLIC BLOOD PRESSURE: 120 MMHG | OXYGEN SATURATION: 97 %

## 2025-04-16 DIAGNOSIS — C83.10 MANTLE CELL LYMPHOMA, UNSPECIFIED SITE: ICD-10-CM

## 2025-04-16 PROCEDURE — 99214 OFFICE O/P EST MOD 30 MIN: CPT

## 2025-04-16 PROCEDURE — G2211 COMPLEX E/M VISIT ADD ON: CPT

## 2025-04-16 PROCEDURE — 36415 COLL VENOUS BLD VENIPUNCTURE: CPT

## 2025-05-22 NOTE — HISTORY OF PRESENT ILLNESS
[de-identified] : Hx of NIKKI and hyponatremia, now on salt tabs BID and 35oz fluid restriction\par Needs PET scan scheduled for eval mantle cell lymphoma\par Should be on BID slat tabs prior to pet scan with increased fluids\par Thyroid biopsy benign\par 
22-May-2025 23:55

## 2025-06-18 ENCOUNTER — APPOINTMENT (OUTPATIENT)
Dept: HEMATOLOGY ONCOLOGY | Facility: CLINIC | Age: 85
End: 2025-06-18
Payer: MEDICARE

## 2025-06-18 ENCOUNTER — RESULT REVIEW (OUTPATIENT)
Age: 85
End: 2025-06-18

## 2025-06-18 VITALS
HEART RATE: 71 BPM | DIASTOLIC BLOOD PRESSURE: 52 MMHG | OXYGEN SATURATION: 97 % | BODY MASS INDEX: 23.01 KG/M2 | SYSTOLIC BLOOD PRESSURE: 89 MMHG | RESPIRATION RATE: 16 BRPM | TEMPERATURE: 97.9 F | WEIGHT: 114.13 LBS | HEIGHT: 59 IN

## 2025-06-18 PROCEDURE — 36415 COLL VENOUS BLD VENIPUNCTURE: CPT

## 2025-06-18 PROCEDURE — 99214 OFFICE O/P EST MOD 30 MIN: CPT | Mod: 25

## 2025-08-20 ENCOUNTER — RESULT REVIEW (OUTPATIENT)
Age: 85
End: 2025-08-20

## 2025-08-20 ENCOUNTER — APPOINTMENT (OUTPATIENT)
Dept: HEMATOLOGY ONCOLOGY | Facility: CLINIC | Age: 85
End: 2025-08-20
Payer: MEDICARE

## 2025-08-20 VITALS
BODY MASS INDEX: 22.58 KG/M2 | DIASTOLIC BLOOD PRESSURE: 67 MMHG | TEMPERATURE: 97.1 F | OXYGEN SATURATION: 99 % | SYSTOLIC BLOOD PRESSURE: 109 MMHG | RESPIRATION RATE: 16 BRPM | HEIGHT: 59 IN | HEART RATE: 73 BPM | WEIGHT: 112 LBS

## 2025-08-20 DIAGNOSIS — C83.10 MANTLE CELL LYMPHOMA, UNSPECIFIED SITE: ICD-10-CM

## 2025-08-20 PROCEDURE — 36415 COLL VENOUS BLD VENIPUNCTURE: CPT

## 2025-08-20 PROCEDURE — 99214 OFFICE O/P EST MOD 30 MIN: CPT | Mod: 25
